# Patient Record
Sex: MALE | Race: WHITE | Employment: FULL TIME | URBAN - METROPOLITAN AREA
[De-identification: names, ages, dates, MRNs, and addresses within clinical notes are randomized per-mention and may not be internally consistent; named-entity substitution may affect disease eponyms.]

---

## 2024-01-14 ENCOUNTER — HOSPITAL ENCOUNTER (INPATIENT)
Facility: HOSPITAL | Age: 39
LOS: 4 days | Discharge: HOME | DRG: 751 | End: 2024-01-18
Attending: EMERGENCY MEDICINE | Admitting: PSYCHIATRY & NEUROLOGY
Payer: MEDICAID

## 2024-01-14 DIAGNOSIS — R45.851 SUICIDAL IDEATION: Primary | ICD-10-CM

## 2024-01-14 DIAGNOSIS — F10.10 ETOH ABUSE: ICD-10-CM

## 2024-01-14 PROBLEM — F32.2 MAJOR DEPRESSIVE DISORDER, SEVERE (HCC): Status: ACTIVE | Noted: 2024-01-14

## 2024-01-14 LAB
ALBUMIN SERPL-MCNC: 4.2 G/DL (ref 3.5–5)
ALBUMIN/GLOB SERPL: 1.2 (ref 1.1–2.2)
ALP SERPL-CCNC: 51 U/L (ref 45–117)
ALT SERPL-CCNC: 28 U/L (ref 12–78)
AMPHET UR QL SCN: NEGATIVE
ANION GAP SERPL CALC-SCNC: 4 MMOL/L (ref 5–15)
APAP SERPL-MCNC: <2 UG/ML (ref 10–30)
AST SERPL-CCNC: 30 U/L (ref 15–37)
BARBITURATES UR QL SCN: NEGATIVE
BASOPHILS # BLD: 0.1 K/UL (ref 0–0.1)
BASOPHILS NFR BLD: 1 % (ref 0–1)
BENZODIAZ UR QL: NEGATIVE
BILIRUB SERPL-MCNC: 0.4 MG/DL (ref 0.2–1)
BUN SERPL-MCNC: 9 MG/DL (ref 6–20)
BUN/CREAT SERPL: 10 (ref 12–20)
CALCIUM SERPL-MCNC: 8.4 MG/DL (ref 8.5–10.1)
CANNABINOIDS UR QL SCN: NEGATIVE
CHLORIDE SERPL-SCNC: 110 MMOL/L (ref 97–108)
CO2 SERPL-SCNC: 28 MMOL/L (ref 21–32)
COCAINE UR QL SCN: NEGATIVE
CREAT SERPL-MCNC: 0.91 MG/DL (ref 0.7–1.3)
DIFFERENTIAL METHOD BLD: ABNORMAL
EOSINOPHIL # BLD: 0.1 K/UL (ref 0–0.4)
EOSINOPHIL NFR BLD: 2 % (ref 0–7)
ERYTHROCYTE [DISTWIDTH] IN BLOOD BY AUTOMATED COUNT: 13.4 % (ref 11.5–14.5)
ETHANOL SERPL-MCNC: 168 MG/DL (ref 0–0.08)
ETHANOL SERPL-MCNC: 226 MG/DL (ref 0–0.08)
ETHANOL SERPL-MCNC: 284 MG/DL (ref 0–0.08)
FLUAV RNA SPEC QL NAA+PROBE: NOT DETECTED
FLUBV RNA SPEC QL NAA+PROBE: NOT DETECTED
GLOBULIN SER CALC-MCNC: 3.4 G/DL (ref 2–4)
GLUCOSE SERPL-MCNC: 97 MG/DL (ref 65–100)
HCT VFR BLD AUTO: 45.6 % (ref 36.6–50.3)
HGB BLD-MCNC: 15.9 G/DL (ref 12.1–17)
IMM GRANULOCYTES # BLD AUTO: 0 K/UL (ref 0–0.04)
IMM GRANULOCYTES NFR BLD AUTO: 0 % (ref 0–0.5)
LIPASE SERPL-CCNC: 60 U/L (ref 13–75)
LYMPHOCYTES # BLD: 2 K/UL (ref 0.8–3.5)
LYMPHOCYTES NFR BLD: 35 % (ref 12–49)
Lab: NORMAL
MCH RBC QN AUTO: 34.2 PG (ref 26–34)
MCHC RBC AUTO-ENTMCNC: 34.9 G/DL (ref 30–36.5)
MCV RBC AUTO: 98.1 FL (ref 80–99)
METHADONE UR QL: NEGATIVE
MONOCYTES # BLD: 0.5 K/UL (ref 0–1)
MONOCYTES NFR BLD: 9 % (ref 5–13)
NEUTS SEG # BLD: 3 K/UL (ref 1.8–8)
NEUTS SEG NFR BLD: 53 % (ref 32–75)
NRBC # BLD: 0 K/UL (ref 0–0.01)
NRBC BLD-RTO: 0 PER 100 WBC
OPIATES UR QL: NEGATIVE
PCP UR QL: NEGATIVE
PLATELET # BLD AUTO: 289 K/UL (ref 150–400)
PMV BLD AUTO: 9.9 FL (ref 8.9–12.9)
POTASSIUM SERPL-SCNC: 3.5 MMOL/L (ref 3.5–5.1)
PROT SERPL-MCNC: 7.6 G/DL (ref 6.4–8.2)
RBC # BLD AUTO: 4.65 M/UL (ref 4.1–5.7)
SALICYLATES SERPL-MCNC: 3.8 MG/DL (ref 2.8–20)
SARS-COV-2 RNA RESP QL NAA+PROBE: NOT DETECTED
SODIUM SERPL-SCNC: 142 MMOL/L (ref 136–145)
SPECIMEN HOLD: NORMAL
WBC # BLD AUTO: 5.7 K/UL (ref 4.1–11.1)

## 2024-01-14 PROCEDURE — 2580000003 HC RX 258: Performed by: NURSE PRACTITIONER

## 2024-01-14 PROCEDURE — 82077 ASSAY SPEC XCP UR&BREATH IA: CPT

## 2024-01-14 PROCEDURE — 36415 COLL VENOUS BLD VENIPUNCTURE: CPT

## 2024-01-14 PROCEDURE — 6370000000 HC RX 637 (ALT 250 FOR IP): Performed by: NURSE PRACTITIONER

## 2024-01-14 PROCEDURE — 90791 PSYCH DIAGNOSTIC EVALUATION: CPT

## 2024-01-14 PROCEDURE — 80053 COMPREHEN METABOLIC PANEL: CPT

## 2024-01-14 PROCEDURE — 80179 DRUG ASSAY SALICYLATE: CPT

## 2024-01-14 PROCEDURE — 1240000000 HC EMOTIONAL WELLNESS R&B

## 2024-01-14 PROCEDURE — 83690 ASSAY OF LIPASE: CPT

## 2024-01-14 PROCEDURE — 99285 EMERGENCY DEPT VISIT HI MDM: CPT

## 2024-01-14 PROCEDURE — 80143 DRUG ASSAY ACETAMINOPHEN: CPT

## 2024-01-14 PROCEDURE — 80307 DRUG TEST PRSMV CHEM ANLYZR: CPT

## 2024-01-14 PROCEDURE — 87636 SARSCOV2 & INF A&B AMP PRB: CPT

## 2024-01-14 PROCEDURE — 85025 COMPLETE CBC W/AUTO DIFF WBC: CPT

## 2024-01-14 RX ORDER — 0.9 % SODIUM CHLORIDE 0.9 %
1000 INTRAVENOUS SOLUTION INTRAVENOUS ONCE
Status: COMPLETED | OUTPATIENT
Start: 2024-01-14 | End: 2024-01-14

## 2024-01-14 RX ORDER — POLYETHYLENE GLYCOL 3350 17 G
2 POWDER IN PACKET (EA) ORAL
Status: DISCONTINUED | OUTPATIENT
Start: 2024-01-14 | End: 2024-01-18 | Stop reason: HOSPADM

## 2024-01-14 RX ORDER — NICOTINE 21 MG/24HR
1 PATCH, TRANSDERMAL 24 HOURS TRANSDERMAL ONCE
Status: DISCONTINUED | OUTPATIENT
Start: 2024-01-14 | End: 2024-01-14

## 2024-01-14 RX ORDER — MULTIVITAMIN WITH IRON
1 TABLET ORAL DAILY
Status: DISCONTINUED | OUTPATIENT
Start: 2024-01-15 | End: 2024-01-18 | Stop reason: HOSPADM

## 2024-01-14 RX ORDER — NICOTINE 21 MG/24HR
1 PATCH, TRANSDERMAL 24 HOURS TRANSDERMAL DAILY
Status: DISCONTINUED | OUTPATIENT
Start: 2024-01-15 | End: 2024-01-18 | Stop reason: HOSPADM

## 2024-01-14 RX ORDER — LANOLIN ALCOHOL/MO/W.PET/CERES
100 CREAM (GRAM) TOPICAL DAILY
Status: DISCONTINUED | OUTPATIENT
Start: 2024-01-15 | End: 2024-01-18 | Stop reason: HOSPADM

## 2024-01-14 RX ADMIN — SODIUM CHLORIDE 1000 ML: 9 INJECTION, SOLUTION INTRAVENOUS at 16:57

## 2024-01-14 RX ADMIN — SODIUM CHLORIDE 1000 ML: 9 INJECTION, SOLUTION INTRAVENOUS at 19:11

## 2024-01-14 ASSESSMENT — LIFESTYLE VARIABLES
HOW OFTEN DO YOU HAVE A DRINK CONTAINING ALCOHOL: 4 OR MORE TIMES A WEEK
HOW MANY STANDARD DRINKS CONTAINING ALCOHOL DO YOU HAVE ON A TYPICAL DAY: 10 OR MORE

## 2024-01-14 ASSESSMENT — PAIN DESCRIPTION - DESCRIPTORS: DESCRIPTORS: ACHING

## 2024-01-14 ASSESSMENT — PAIN DESCRIPTION - LOCATION: LOCATION: ABDOMEN

## 2024-01-14 ASSESSMENT — SLEEP AND FATIGUE QUESTIONNAIRES
DO YOU HAVE DIFFICULTY SLEEPING: YES
SLEEP PATTERN: DIFFICULTY FALLING ASLEEP
DO YOU USE A SLEEP AID: NO
AVERAGE NUMBER OF SLEEP HOURS: 6

## 2024-01-14 ASSESSMENT — PAIN SCALES - GENERAL
PAINLEVEL_OUTOF10: 5
PAINLEVEL_OUTOF10: 0

## 2024-01-14 ASSESSMENT — PAIN - FUNCTIONAL ASSESSMENT: PAIN_FUNCTIONAL_ASSESSMENT: 0-10

## 2024-01-14 NOTE — ED TRIAGE NOTES
Triage note: Pt reports etoh abuse and struggling with SI. Pt reports 8-10 drinks daily. Pt denies current plan for self harm. Current episode has been ongoing for 4-5 days. Last drink was 10 am today.     Pt further reports abd pain for past 2-3 months.    Pt uses She/Her pronouns and prefers the name \"Lucille\"

## 2024-01-14 NOTE — BSMART NOTE
BSMART assessment completed, and suicide risk level noted to be high. Charge Nurse, Conchis and ED Medical Provider, WLISON Forbes notified. Concerns observed by patient having access to her belongings and no 1:1 sitter present at this time.       
her job at [employer] after coming in this morning intoxicated.  Pt explains she has been living at a recovery home for the last 15-16 months and does not enjoy living w/ the other residents.  Pt reports a PMH of Bipolar Disorder and has been off all her medications for the last 3-4 months.  Pt reports she stopped participating in psych out-pt treatment since her recovery home moved to [facility] and has been feeling overwhelmed.  Pt reports previously seeing Elissa Avila LCSW for therapy, but does not remember the name of her psychiatrist.  Pt reports previous psychiatric hospitalizations in [place], [place] and [place].  Pt states she more recently was seen at [facility]'s ED 6 months ago, but was discharged home at that time. Pt reports daily ETOH use over the last 2 months stating she will drink about 2-4 cans of \"mixed drinks\" w/ her last drink being around 1000 this morning.  Pt denies any history of seizures, but reports previous dts 10 years ago.  Pt denies any other substance use, access to firearms or pending legal charges.  Pt denies using any medical equipment or issues completing her ADLs independently.     Pt is seeking voluntary psychiatric hospitalization d/t worsening SI and inability to contract for safety.  Pt is agreeable to transfer if needed for placement.  Consulted w/ the ED Medical Provider, Silas Forbes NP who is in agreement w/ plan for admission pending medical clearance.  Notified Aracelis w/ [facility] regarding need for bed placement.     The patient has demonstrated mental capacity to provide informed consent.  The information is given by the patient.  The Chief Complaint is SI.  The Precipitant Factors are ETOH abuse and noncompliance w/ medications.  Previous Hospitalizations: Yes, pt reports previous psychiatric hospitalizations in [place], [place] and [place].   The patient has not previously been in restraints.  Current Psychiatrist and/or  is N/A    Lethality

## 2024-01-14 NOTE — ED PROVIDER NOTES
[facility] EMERGENCY DEP  EMERGENCY DEPARTMENT ENCOUNTER      Pt Name: Meir Barker  MRN: [id number]  Birthdate 1985  Date of evaluation: 1/14/2024  Provider: TERRY Armijo - PAMELA   Patient is a 38 y.o. male with pmhx of biploar  who presents today with complaints of suicidal ideation.  Important to note- Patient's preferred name is Lucille, uses she/her pronouns. Endorses ETOH abuse- \" clubtails\" 4-5 mixed premade can drinks a day. States this has been going on for a week,  and feels like a danger to self. Denies SI plan.   Has had 4 drinks already this AM, last at 10 AM. Denies drug use, is a tobacco  smoker. Denies self harm.  Denies wepons in the home.  Also endrses generalized abd pain for the last 2-3 months, denies fevers, chills, N/V/D.  No previous hx of pancreatitis. Has had many episodes of ETOH withdrawal- last hopitalized for this 6 month ago. Has been off his biploar medication for the last month. Is not sleeping well.          There are no other complaints, changes or physical findings at this time.      PAST MEDICAL HISTORY     Past Medical History:   Diagnosis Date    Depression          SURGICAL HISTORY     History reviewed. No pertinent surgical history.      CURRENT MEDICATIONS       Previous Medications    No medications on file       ALLERGIES     Patient has no known allergies.    FAMILY HISTORY     History reviewed. No pertinent family history.       SOCIAL HISTORY       Social History     Socioeconomic History    Marital status: Single     Spouse name: None    Number of children: None    Years of education: None    Highest education level: None   Tobacco Use    Smoking status: Every Day     Types: Cigarettes    Smokeless tobacco: Never   Substance and Sexual Activity    Alcohol use: Yes     Alcohol/week: 70.0 standard drinks of alcohol     Types: 70 Cans of beer per week    Drug use: Not Currently           Review of Systems   Constitutional:  Negative for fever.   HENT:  Negative

## 2024-01-15 PROCEDURE — 6370000000 HC RX 637 (ALT 250 FOR IP)

## 2024-01-15 PROCEDURE — 6370000000 HC RX 637 (ALT 250 FOR IP): Performed by: PSYCHIATRY & NEUROLOGY

## 2024-01-15 PROCEDURE — 1240000000 HC EMOTIONAL WELLNESS R&B

## 2024-01-15 RX ORDER — HALOPERIDOL 5 MG/ML
5 INJECTION INTRAMUSCULAR EVERY 4 HOURS PRN
Status: DISCONTINUED | OUTPATIENT
Start: 2024-01-15 | End: 2024-01-18 | Stop reason: HOSPADM

## 2024-01-15 RX ORDER — SENNOSIDES A AND B 8.6 MG/1
1 TABLET, FILM COATED ORAL DAILY PRN
Status: DISCONTINUED | OUTPATIENT
Start: 2024-01-15 | End: 2024-01-18 | Stop reason: HOSPADM

## 2024-01-15 RX ORDER — MAGNESIUM HYDROXIDE/ALUMINUM HYDROXICE/SIMETHICONE 120; 1200; 1200 MG/30ML; MG/30ML; MG/30ML
30 SUSPENSION ORAL EVERY 6 HOURS PRN
Status: DISCONTINUED | OUTPATIENT
Start: 2024-01-15 | End: 2024-01-18 | Stop reason: HOSPADM

## 2024-01-15 RX ORDER — POLYETHYLENE GLYCOL 3350 17 G/17G
17 POWDER, FOR SOLUTION ORAL DAILY PRN
Status: DISCONTINUED | OUTPATIENT
Start: 2024-01-15 | End: 2024-01-18 | Stop reason: HOSPADM

## 2024-01-15 RX ORDER — HALOPERIDOL 5 MG/1
5 TABLET ORAL EVERY 4 HOURS PRN
Status: DISCONTINUED | OUTPATIENT
Start: 2024-01-15 | End: 2024-01-18 | Stop reason: HOSPADM

## 2024-01-15 RX ORDER — PHENOBARBITAL 32.4 MG/1
32.4 TABLET ORAL 2 TIMES DAILY
Status: COMPLETED | OUTPATIENT
Start: 2024-01-16 | End: 2024-01-16

## 2024-01-15 RX ORDER — DIPHENHYDRAMINE HYDROCHLORIDE 50 MG/ML
50 INJECTION INTRAMUSCULAR; INTRAVENOUS EVERY 4 HOURS PRN
Status: DISCONTINUED | OUTPATIENT
Start: 2024-01-15 | End: 2024-01-18 | Stop reason: HOSPADM

## 2024-01-15 RX ORDER — PHENOBARBITAL 32.4 MG/1
32.4 TABLET ORAL EVERY 6 HOURS PRN
Status: ACTIVE | OUTPATIENT
Start: 2024-01-15 | End: 2024-01-17

## 2024-01-15 RX ORDER — TRAZODONE HYDROCHLORIDE 50 MG/1
50 TABLET ORAL NIGHTLY PRN
Status: DISCONTINUED | OUTPATIENT
Start: 2024-01-15 | End: 2024-01-18 | Stop reason: HOSPADM

## 2024-01-15 RX ORDER — FOLIC ACID 1 MG/1
1 TABLET ORAL DAILY
Status: DISCONTINUED | OUTPATIENT
Start: 2024-01-15 | End: 2024-01-18 | Stop reason: HOSPADM

## 2024-01-15 RX ORDER — PHENOBARBITAL 32.4 MG/1
16.2 TABLET ORAL 2 TIMES DAILY
Status: COMPLETED | OUTPATIENT
Start: 2024-01-17 | End: 2024-01-17

## 2024-01-15 RX ORDER — PHENOBARBITAL 32.4 MG/1
16.2 TABLET ORAL EVERY 6 HOURS PRN
Status: ACTIVE | OUTPATIENT
Start: 2024-01-17 | End: 2024-01-18

## 2024-01-15 RX ORDER — PHENOBARBITAL 32.4 MG/1
32.4 TABLET ORAL 4 TIMES DAILY
Status: COMPLETED | OUTPATIENT
Start: 2024-01-15 | End: 2024-01-15

## 2024-01-15 RX ORDER — ACETAMINOPHEN 325 MG/1
650 TABLET ORAL EVERY 4 HOURS PRN
Status: DISCONTINUED | OUTPATIENT
Start: 2024-01-15 | End: 2024-01-18 | Stop reason: HOSPADM

## 2024-01-15 RX ORDER — HYDROXYZINE 50 MG/1
50 TABLET, FILM COATED ORAL 3 TIMES DAILY PRN
Status: DISCONTINUED | OUTPATIENT
Start: 2024-01-15 | End: 2024-01-18 | Stop reason: HOSPADM

## 2024-01-15 RX ADMIN — PHENOBARBITAL 32.4 MG: 32.4 TABLET ORAL at 10:57

## 2024-01-15 RX ADMIN — FOLIC ACID 1 MG: 1 TABLET ORAL at 10:57

## 2024-01-15 RX ADMIN — PHENOBARBITAL 32.4 MG: 32.4 TABLET ORAL at 21:30

## 2024-01-15 RX ADMIN — PHENOBARBITAL 32.4 MG: 32.4 TABLET ORAL at 17:50

## 2024-01-15 RX ADMIN — ACETAMINOPHEN 650 MG: 325 TABLET ORAL at 17:51

## 2024-01-15 RX ADMIN — PHENOBARBITAL 32.4 MG: 32.4 TABLET ORAL at 12:58

## 2024-01-15 RX ADMIN — CARIPRAZINE 1.5 MG: 1.5 CAPSULE, GELATIN COATED ORAL at 10:57

## 2024-01-15 RX ADMIN — HYDROXYZINE HYDROCHLORIDE 50 MG: 50 TABLET, FILM COATED ORAL at 08:54

## 2024-01-15 ASSESSMENT — PAIN DESCRIPTION - LOCATION: LOCATION: HEAD

## 2024-01-15 ASSESSMENT — PAIN DESCRIPTION - DESCRIPTORS: DESCRIPTORS: ACHING

## 2024-01-15 ASSESSMENT — PAIN SCALES - GENERAL: PAINLEVEL_OUTOF10: 4

## 2024-01-15 NOTE — PLAN OF CARE
Meir is resting quietly in bed at this time, q15 minute safety checks maintained. Breathing is even and unlabored, patient is in NAD.    Problem: Sleep Disturbance  Goal: Will exhibit normal sleeping pattern  Description: INTERVENTIONS:  1. Administer medication as ordered  2. Decrease environmental stimuli, including noise, as appropriate  3. Discourage social isolation and naps during the day  Outcome: Progressing     Problem: Pain  Goal: Verbalizes/displays adequate comfort level or baseline comfort level  Outcome: Progressing

## 2024-01-15 NOTE — PLAN OF CARE
Problem: Depression  Goal: Will be euthymic at discharge  Description: INTERVENTIONS:  1. Administer medication as ordered  2. Provide emotional support via 1:1 interaction with staff  3. Encourage involvement in milieu/groups/activities  4. Monitor for social isolation  1/15/2024 1541 by Britt eWlsh, RN  Outcome: Progressing   Pt isolative to room. Denies current SI/HI/AVH. Remains medication and meal compliant. Will continue to monitor q15 minutes for safety.

## 2024-01-15 NOTE — H&P
PSYCHIATRY EVALUATION NOTE    CHIEF COMPLAINT:  \"I just want to feel better.\"    HISTORY OF PRESENTING COMPLAINT:  Meir Barker is a 38 y.o. White (non-) adult who is currently admitted to the acute/general side of 7th floor behavioral health Unit at [facility].     Patient identifies as a female, goes by 'Lucille.'    Patient's co-workers wanted her to go to [facility] because they were concerned about her behavior and suspected that she was under the influence of alcohol. Patient says that she preferred to go to the inpatient psychiatric setting to received comprehensive services for depressive symptoms and possibly pursue sobriety after detox.    Upon evaluation patient appears subdued and describes struggling with depressive symptoms and passive death wish. At this time no suicidal intent or plan.    Denies experiencing hallucinations of any type.  No fire-arms.      PAST PSYCHIATRIC HISTORY   many past inpatient psychiatric admissions, last being  2 years  1 suicide attempts, last being in mid 20's.    Last seen psychiatry 2-3 months. Provider is [facility] who prescribes medication, seen once or twice.  Kallie is therapist for about 1 years, seen weekly.    Last rehab was 2 years ago, at [facility]. Did attend 12 step.  Looking into rehab.    Poor tolerance of effexor and wellbutrin, causing patient to have 'manic' episodes.  Feels vraylar helped best.  Lamictal helped.  Naltrexone for alochol.    SUBSTANCE USE HISTORY:  Tobacco: 1/2 PPD  Cannabis: none  Alcohol: started drinking at 19. Last drink yesterday. Has been drinking daily for 2 weeks. Drinks cans of pre-mixed alcohol. 10-12% 16oz. Says that consumes 3-6 cans. First drink in am.  No withdrawal seizures.  Struggles with sweats, flushed. No DUIs. Memory blackouts. This is affecting current job.   IVD: none  No Cocaine/Heroin/amphetamines/LSD/PCP/Ketamine    PAST MEDICAL HISTORY:    Please see H&P for details.     Past Medical

## 2024-01-15 NOTE — PLAN OF CARE
Problem: Depression  Goal: Will be euthymic at discharge  Description: INTERVENTIONS:  1. Administer medication as ordered  2. Provide emotional support via 1:1 interaction with staff  3. Encourage involvement in milieu/groups/activities  4. Monitor for social isolation  Outcome: Progressing  Note: Out on unit passively engaged, mood and affect sad to anxious. Reports feeling sad, shame and guilt for his etoh use and ongoing sadness. Denies SI with no plan, no intent and no self harming behaviors. States feeling safe on unit. Daily goal is to rest, discuss medications. Staff focus is on offering support     Problem: Drug Abuse/Detox  Goal: Will have no detox symptoms and will verbalize plan for changing drug-related behavior  Description: INTERVENTIONS:  1. Administer medication as ordered  2. Monitor physical status  3. Provide emotional support with 1:1 interaction with staff  4. Encourage  recovery focused treatment   Outcome: Progressing  Flowsheets (Taken 1/15/2024 1011)  Will have no detox symptoms and will verbalize plan for changing drug-related behavior:   Administer medication as ordered   Monitor physical status   Encourage recovery focused treatment   Provide emotional support with 1:1 interaction with staff  Note: CIWA unremarkable, review detox protocol and orders available. Pt verbalized understanidng

## 2024-01-15 NOTE — ED NOTES
TRANSFER - OUT REPORT:    Verbal report given to tonya Prado  on Meir Barker  being transferred to  72 for routine progression of patient care       Report consisted of patient's Situation, Background, Assessment and   Recommendations(SBAR).     Information from the following report(s) Nurse Handoff Report, Index, ED Encounter Summary, Adult Overview, and Recent Results was reviewed with the receiving nurse.    [place] Fall Assessment:    Presents to emergency department  because of falls (Syncope, seizure, or loss of consciousness): No  Age > 70: No  Altered Mental Status, Intoxication with alcohol or substance confusion (Disorientation, impaired judgment, poor safety awaremess, or inability to follow instructions): No  Impaired Mobility: Ambulates or transfers with assistive devices or assistance; Unable to ambulate or transer.: No  Nursing Judgement: No          Lines:   Peripheral IV 01/14/24 Proximal;Right;Ventral Forearm (Active)        Opportunity for questions and clarification was provided.      Patient transported with:  Registered Nurse and Security

## 2024-01-16 LAB
ALBUMIN SERPL-MCNC: 3.4 G/DL (ref 3.5–5)
ALBUMIN/GLOB SERPL: 1.2 (ref 1.1–2.2)
ALP SERPL-CCNC: 43 U/L (ref 45–117)
ALT SERPL-CCNC: 26 U/L (ref 12–78)
ANION GAP SERPL CALC-SCNC: 9 MMOL/L (ref 5–15)
AST SERPL-CCNC: 20 U/L (ref 15–37)
BASOPHILS # BLD: 0.1 K/UL (ref 0–0.1)
BASOPHILS NFR BLD: 1 % (ref 0–1)
BILIRUB SERPL-MCNC: 1 MG/DL (ref 0.2–1)
BUN SERPL-MCNC: 10 MG/DL (ref 6–20)
BUN/CREAT SERPL: 11 (ref 12–20)
CALCIUM SERPL-MCNC: 8.4 MG/DL (ref 8.5–10.1)
CHLORIDE SERPL-SCNC: 110 MMOL/L (ref 97–108)
CHOLEST SERPL-MCNC: 160 MG/DL
CO2 SERPL-SCNC: 23 MMOL/L (ref 21–32)
CREAT SERPL-MCNC: 0.89 MG/DL (ref 0.7–1.3)
DIFFERENTIAL METHOD BLD: NORMAL
EKG ATRIAL RATE: 55 BPM
EKG DIAGNOSIS: NORMAL
EKG P AXIS: 70 DEGREES
EKG P-R INTERVAL: 148 MS
EKG Q-T INTERVAL: 406 MS
EKG QRS DURATION: 82 MS
EKG QTC CALCULATION (BAZETT): 388 MS
EKG R AXIS: 78 DEGREES
EKG T AXIS: 70 DEGREES
EKG VENTRICULAR RATE: 55 BPM
EOSINOPHIL # BLD: 0.1 K/UL (ref 0–0.4)
EOSINOPHIL NFR BLD: 3 % (ref 0–7)
ERYTHROCYTE [DISTWIDTH] IN BLOOD BY AUTOMATED COUNT: 13.2 % (ref 11.5–14.5)
EST. AVERAGE GLUCOSE BLD GHB EST-MCNC: 97 MG/DL
GLOBULIN SER CALC-MCNC: 2.9 G/DL (ref 2–4)
GLUCOSE SERPL-MCNC: 94 MG/DL (ref 65–100)
HBA1C MFR BLD: 5 % (ref 4–5.6)
HCT VFR BLD AUTO: 43.3 % (ref 36.6–50.3)
HDLC SERPL-MCNC: 66 MG/DL
HDLC SERPL: 2.4 (ref 0–5)
HGB BLD-MCNC: 14.9 G/DL (ref 12.1–17)
IMM GRANULOCYTES # BLD AUTO: 0 K/UL (ref 0–0.04)
IMM GRANULOCYTES NFR BLD AUTO: 0 % (ref 0–0.5)
LDLC SERPL CALC-MCNC: 59 MG/DL (ref 0–100)
LYMPHOCYTES # BLD: 1.5 K/UL (ref 0.8–3.5)
LYMPHOCYTES NFR BLD: 33 % (ref 12–49)
MCH RBC QN AUTO: 32.9 PG (ref 26–34)
MCHC RBC AUTO-ENTMCNC: 34.4 G/DL (ref 30–36.5)
MCV RBC AUTO: 95.6 FL (ref 80–99)
MONOCYTES # BLD: 0.6 K/UL (ref 0–1)
MONOCYTES NFR BLD: 12 % (ref 5–13)
NEUTS SEG # BLD: 2.3 K/UL (ref 1.8–8)
NEUTS SEG NFR BLD: 51 % (ref 32–75)
NRBC # BLD: 0 K/UL (ref 0–0.01)
NRBC BLD-RTO: 0 PER 100 WBC
PLATELET # BLD AUTO: 243 K/UL (ref 150–400)
PMV BLD AUTO: 10.2 FL (ref 8.9–12.9)
POTASSIUM SERPL-SCNC: 3.9 MMOL/L (ref 3.5–5.1)
PROT SERPL-MCNC: 6.3 G/DL (ref 6.4–8.2)
RBC # BLD AUTO: 4.53 M/UL (ref 4.1–5.7)
SODIUM SERPL-SCNC: 142 MMOL/L (ref 136–145)
TRIGL SERPL-MCNC: 175 MG/DL
VLDLC SERPL CALC-MCNC: 35 MG/DL
WBC # BLD AUTO: 4.5 K/UL (ref 4.1–11.1)

## 2024-01-16 PROCEDURE — 80061 LIPID PANEL: CPT

## 2024-01-16 PROCEDURE — 80053 COMPREHEN METABOLIC PANEL: CPT

## 2024-01-16 PROCEDURE — 6370000000 HC RX 637 (ALT 250 FOR IP)

## 2024-01-16 PROCEDURE — 85025 COMPLETE CBC W/AUTO DIFF WBC: CPT

## 2024-01-16 PROCEDURE — 6370000000 HC RX 637 (ALT 250 FOR IP): Performed by: PSYCHIATRY & NEUROLOGY

## 2024-01-16 PROCEDURE — 1240000000 HC EMOTIONAL WELLNESS R&B

## 2024-01-16 PROCEDURE — 93005 ELECTROCARDIOGRAM TRACING: CPT | Performed by: PSYCHIATRY & NEUROLOGY

## 2024-01-16 PROCEDURE — 83036 HEMOGLOBIN GLYCOSYLATED A1C: CPT

## 2024-01-16 PROCEDURE — 36415 COLL VENOUS BLD VENIPUNCTURE: CPT

## 2024-01-16 RX ADMIN — CARIPRAZINE 1.5 MG: 1.5 CAPSULE, GELATIN COATED ORAL at 08:51

## 2024-01-16 RX ADMIN — TRAZODONE HYDROCHLORIDE 50 MG: 50 TABLET ORAL at 20:53

## 2024-01-16 RX ADMIN — PHENOBARBITAL 32.4 MG: 32.4 TABLET ORAL at 20:53

## 2024-01-16 RX ADMIN — PHENOBARBITAL 32.4 MG: 32.4 TABLET ORAL at 08:52

## 2024-01-16 RX ADMIN — THERA TABS 1 TABLET: TAB at 08:52

## 2024-01-16 RX ADMIN — Medication 100 MG: at 08:52

## 2024-01-16 RX ADMIN — FOLIC ACID 1 MG: 1 TABLET ORAL at 08:52

## 2024-01-16 NOTE — PLAN OF CARE
Problem: Depression  Goal: Will be euthymic at discharge  Description: INTERVENTIONS:  1. Administer medication as ordered  2. Provide emotional support via 1:1 interaction with staff  3. Encourage involvement in milieu/groups/activities  4. Monitor for social isolation  1/16/2024 1546 by Britt Welsh, RN  Outcome: Progressing     Problem: Safety - Adult  Goal: Free from fall injury  1/16/2024 1546 by Britt eWlsh, RN  Outcome: Progressing   Pt isolative to self. Denies current SI/HI/AVH. Remains medication and meal compliant. Will continue to monitor q15 minutes for safety.

## 2024-01-16 NOTE — PLAN OF CARE
Problem: Depression  Goal: Will be euthymic at discharge  Description: INTERVENTIONS:  1. Administer medication as ordered  2. Provide emotional support via 1:1 interaction with staff  3. Encourage involvement in milieu/groups/activities  4. Monitor for social isolation  Outcome: Progressing  Note: Withdrawn to room, declined to attend groups, helplessness behaviors noted. Denies SI with no plan, no intent and no self harming behaviors. Daily goal is to rest, read and discuss d/c plans to sober living. Staff focus is on offering support     Problem: Drug Abuse/Detox  Goal: Will have no detox symptoms and will verbalize plan for changing drug-related behavior  Description: INTERVENTIONS:  1. Administer medication as ordered  2. Monitor physical status  3. Provide emotional support with 1:1 interaction with staff  4. Encourage  recovery focused treatment   Outcome: Progressing  Flowsheets (Taken 1/16/2024 0156)  Will have no detox symptoms and will verbalize plan for changing drug-related behavior:   Administer medication as ordered   Monitor physical status   Encourage recovery focused treatment  Note: WILLIAM gimenez

## 2024-01-16 NOTE — INTERDISCIPLINARY ROUNDS
Behavioral Health Interdisciplinary Rounds     Patient Name: Meir Barker  Age: 38 y.o.  Room/Bed:  72/  Primary Diagnosis: Major depressive disorder, severe (HCC)   Admission Status: Voluntary    Readmission within 30 days: No  Power of  in place: No  Patient requires a blocked bed: Yes          Reason for blocked bed: transgender  Sleep hours:       Participation in Care/Groups:  Yes  Medication Compliant?: Yes  PRNS (last 24 hours): atarax, tylenol   Restraints (last 24 hours):  No  __________________________________________________  OQ Admission Analysis Survey completed:  OQ Admission Analysis Survey score:  __________________________________________________     Alcohol screening (AUDIT) completed -     If applicable, date SBIRT discussed in treatment team AND documented:    Tobacco - patient is a smoker:    Illegal Drugs use:      24 hour chart check complete: Yes    _______________________________________________    Patient goal(s) for today:   Treatment team focus/goals:   Progress note: Patient met with treatment team and appeared with a drowsy mood and affect. Patient endorsing detox symptoms of sweating, drowsiness and headache. Patient reports fair sleep overnight and fair appetite. Patient reports mood is down, stating slightly better since time of admission. Patient denies SI/HI and AHVH. Patient tolerating Vraylar so far. Patient requesting to shower and to shave. Plan to continue to monitor medications and withdrawals.     Spiritual Care Consult: Yes  Financial concerns/prescription coverage: Insured  Family contact: None  Family requesting physician contact today:  No  Discharge plan: Discharge home  Access to weapons : No                          Outpatient provider(s): [facility]    LOS:  2  Expected LOS:     Participating treatment team members: Meir Barker, CHELSIE Reddy, Aleyda ORR RN, Minerva Aldana NP, Joann Herrera PharmD

## 2024-01-16 NOTE — PLAN OF CARE
Problem: Safety - Adult  Goal: Free from fall injury  Outcome: Progressing   Pt resting in bed with eyes closed, appears to be sleeping. Respirations even and unlabored, no respiratory distress noted. Q15 min safety checks in place.

## 2024-01-17 PROCEDURE — 6370000000 HC RX 637 (ALT 250 FOR IP): Performed by: PSYCHIATRY & NEUROLOGY

## 2024-01-17 PROCEDURE — 6370000000 HC RX 637 (ALT 250 FOR IP)

## 2024-01-17 PROCEDURE — 1240000000 HC EMOTIONAL WELLNESS R&B

## 2024-01-17 RX ADMIN — PHENOBARBITAL 16.2 MG: 32.4 TABLET ORAL at 09:40

## 2024-01-17 RX ADMIN — THERA TABS 1 TABLET: TAB at 09:40

## 2024-01-17 RX ADMIN — Medication 100 MG: at 09:41

## 2024-01-17 RX ADMIN — TRAZODONE HYDROCHLORIDE 50 MG: 50 TABLET ORAL at 20:39

## 2024-01-17 RX ADMIN — FOLIC ACID 1 MG: 1 TABLET ORAL at 09:41

## 2024-01-17 RX ADMIN — CARIPRAZINE 1.5 MG: 1.5 CAPSULE, GELATIN COATED ORAL at 09:41

## 2024-01-17 RX ADMIN — PHENOBARBITAL 16.2 MG: 32.4 TABLET ORAL at 20:39

## 2024-01-17 ASSESSMENT — PAIN SCALES - GENERAL: PAINLEVEL_OUTOF10: 0

## 2024-01-17 NOTE — DISCHARGE INSTRUCTIONS
DISCHARGE SUMMARY    NAME:Meir Barker  : 1985  MRN: [id number]    The patient Meir Barker exhibits the ability to control behavior in a less restrictive environment.  Patient's level of functioning is improving.  No assaultive/destructive behavior has been observed for the past 24 hours.  No suicidal/homicidal threat or behavior has been observed for the past 24 hours.  There is no evidence of serious medication side effects.  Patient has not been in physical or protective restraints for at least the past 24 hours.    If weapons involved, how are they secured? None    Is patient aware of and in agreement with discharge plan? Yes    Arrangements for medication:  Prescriptions filled through [facility], 30 day supply provided    Copy of discharge instructions to provider?: Yes    Arrangements for transportation home: Lyft    Keep all follow up appointments as scheduled, continue to take prescribed medications per physician instructions.  Mental health crisis number:  911 or your local mental health crisis line number at [facility] at [phone]      Mental Health Emergency WARM LINE      [phone]-MHAV [phone])      M-F: 9am to 9pm      Sat & Sun: 5pm - 9pm  National suicide prevention lines:                             8-714-QJIGVIT (1-690.991.6976)       8-518-148-TALK ([phone])    Crisis Text Line:  Text HOME to 334490        DISCHARGE SUMMARY from Nurse    PATIENT INSTRUCTIONS:    What to do at Home:  Recommended activity: activity as tolerated,     If you experience any of the following symptoms: urges to drink alcohol, anxiety that is intense and overwhelming that leads to feeling unsafe - talk with staff at [facility]. If you are feeling unsafe and your support system is not available immediately call your local crisis number at [phone]    *  Please give a list of your current medications to your Primary Care Provider.    *  Please update this list whenever your

## 2024-01-17 NOTE — PLAN OF CARE
Problem: Discharge Planning  Goal: Discharge to home or other facility with appropriate resources  Flowsheets (Taken 1/17/2024 0027)  Discharge to home or other facility with appropriate resources:   Identify barriers to discharge with patient and caregiver   Identify discharge learning needs (meds, wound care, etc)

## 2024-01-17 NOTE — PLAN OF CARE
Problem: Depression  Goal: Will be euthymic at discharge  Description: INTERVENTIONS:  1. Administer medication as ordered  2. Provide emotional support via 1:1 interaction with staff  3. Encourage involvement in milieu/groups/activities  4. Monitor for social isolation  Outcome: Progressing     Problem: Drug Abuse/Detox  Goal: Will have no detox symptoms and will verbalize plan for changing drug-related behavior  Description: INTERVENTIONS:  1. Administer medication as ordered  2. Monitor physical status  3. Provide emotional support with 1:1 interaction with staff  4. Encourage  recovery focused treatment   Outcome: Progressing    1240: Patient is participatory in treatment team. Mood and affect; calm, cooperative and pleasant but remains isolative at times. Denies SI/HI. Denies AH/VH. Medication and meal complaint. Plan of care ongoing, no further concerns as of present. Patient expresses no other needs at this time.  Plans are for discharge tomorrow.

## 2024-01-17 NOTE — PLAN OF CARE
Problem: Depression  Goal: Will be euthymic at discharge  Description: INTERVENTIONS:  1. Administer medication as ordered  2. Provide emotional support via 1:1 interaction with staff  3. Encourage involvement in milieu/groups/activities  4. Monitor for social isolation  1/17/2024 1546 by Britt Welsh, RN  Outcome: Progressing     Problem: Safety - Adult  Goal: Free from fall injury  Outcome: Progressing   Pt out on unit for meds and meals, otherwise isolative. Denies current SI/HI/AVH. Remains medication and meal compliant. Will continue to monitor q15 minutes for safety.

## 2024-01-18 VITALS
DIASTOLIC BLOOD PRESSURE: 72 MMHG | HEART RATE: 77 BPM | WEIGHT: 148.59 LBS | BODY MASS INDEX: 21.27 KG/M2 | HEIGHT: 70 IN | OXYGEN SATURATION: 100 % | TEMPERATURE: 98.1 F | SYSTOLIC BLOOD PRESSURE: 105 MMHG | RESPIRATION RATE: 16 BRPM

## 2024-01-18 PROCEDURE — 6370000000 HC RX 637 (ALT 250 FOR IP): Performed by: PSYCHIATRY & NEUROLOGY

## 2024-01-18 PROCEDURE — 6370000000 HC RX 637 (ALT 250 FOR IP)

## 2024-01-18 RX ORDER — TRAZODONE HYDROCHLORIDE 50 MG/1
50 TABLET ORAL NIGHTLY PRN
Qty: 30 TABLET | Refills: 0 | Status: SHIPPED | OUTPATIENT
Start: 2024-01-18

## 2024-01-18 RX ORDER — MULTIVITAMIN WITH IRON
1 TABLET ORAL DAILY
Qty: 30 TABLET | Refills: 0 | Status: SHIPPED | OUTPATIENT
Start: 2024-01-19

## 2024-01-18 RX ADMIN — CARIPRAZINE 1.5 MG: 1.5 CAPSULE, GELATIN COATED ORAL at 09:05

## 2024-01-18 RX ADMIN — FOLIC ACID 1 MG: 1 TABLET ORAL at 09:05

## 2024-01-18 RX ADMIN — THERA TABS 1 TABLET: TAB at 09:05

## 2024-01-18 RX ADMIN — Medication 100 MG: at 09:05

## 2024-01-18 ASSESSMENT — PAIN SCALES - GENERAL: PAINLEVEL_OUTOF10: 0

## 2024-01-18 NOTE — PLAN OF CARE
Problem: Discharge Planning  Goal: Discharge to home or other facility with appropriate resources  Flowsheets (Taken 1/17/2024 2340)  Discharge to home or other facility with appropriate resources:   Identify barriers to discharge with patient and caregiver   Identify discharge learning needs (meds, wound care, etc)     Problem: Drug Abuse/Detox  Goal: Will have no detox symptoms and will verbalize plan for changing drug-related behavior  Description: INTERVENTIONS:  1. Administer medication as ordered  2. Monitor physical status  3. Provide emotional support with 1:1 interaction with staff  4. Encourage  recovery focused treatment   Flowsheets (Taken 1/17/2024 2340)  Will have no detox symptoms and will verbalize plan for changing drug-related behavior:   Administer medication as ordered   Provide emotional support with 1:1 interaction with staff

## 2024-01-18 NOTE — DISCHARGE SUMMARY
and judgment are partial    PROGNOSIS:   Good / Fair based on nature of patient's pathology/ies and treatment compliance issues. Prognosis is greatly dependent upon patient's motivation, decisions, and ability to follow up on psychiatric/psychotherapy appointments, ability to follow recommended lifestyle modifications such as abstaining from illicit drug and alcohol abuse, as well as to comply with psychiatric medications as prescribed.

## 2024-01-18 NOTE — BH NOTE
[facility]  PSYCHIATRIC PROGRESS NOTE    Patient: Meir Barker MRN: [id number]  SSN: [id number]    YOB: 1985  Age: 38 y.o.  Sex: adult      Admit Date: 1/14/2024    Length of Stay: 2 Days    Legal Status: Voluntary    Chief Complaint: \"I still kind of had the sweats this morning.\"     Interval History:   1/16/24- Lucille reports some ongoing detox symptoms of headache, sweating, but feels her overall detox is getting better. She slept well overall last night though she is still feeling tired today. Mood is described as \"kind of down,\" though she does feel it is somewhat improving. Appetite is good. She is tolerating her Vraylar well without adverse effects. She denies any SI/HI/AH/VH. She is calm and appropriate. Denies other changes or new concerns.   Nursing reports the pt has not been participating in groups.     Vitals:  Patient Vitals for the past 24 hrs:   Temp Pulse Resp BP SpO2   01/16/24 0844 97.5 °F (36.4 °C) 80 16 97/86 100 %   01/15/24 2000 97.7 °F (36.5 °C) 62 14 121/89 100 %   01/15/24 1610 97.9 °F (36.6 °C) 63 18 (!) 112/90 98 %   01/15/24 1047 97.9 °F (36.6 °C) 77 20 125/86 99 %     Labs:  Lab Results   Component Value Date/Time    WBC 4.5 01/16/2024 05:27 AM    HGB 14.9 01/16/2024 05:27 AM    HCT 43.3 01/16/2024 05:27 AM     01/16/2024 05:27 AM    MCV 95.6 01/16/2024 05:27 AM      Lab Results   Component Value Date/Time     01/16/2024 05:27 AM    K 3.9 01/16/2024 05:27 AM     01/16/2024 05:27 AM    CO2 23 01/16/2024 05:27 AM    BUN 10 01/16/2024 05:27 AM    GLOB 2.9 01/16/2024 05:27 AM    ALT 26 01/16/2024 05:27 AM      Scheduled Meds:   PHENobarbital  32.4 mg Oral BID    Followed by    [START ON 1/17/2024] PHENobarbital  16.2 mg Oral BID    folic acid  1 mg Oral Daily    cariprazine hcl  1.5 mg Oral Daily    thiamine  100 mg Oral Daily    multivitamin  1 tablet Oral Daily    nicotine  1 patch TransDERmal Daily     PRN Meds:  acetaminophen, polyethylene 
Behavioral Health Interdisciplinary Rounds     Patient goal(s) for today: Express needs o staff and discuss readiness for D/C  Treatment team focus/goals: Assess progress and discuss D/C  Progress note: Pt met with tx team. Appeared fair mood and calm affect. Pt reported some difficulty sleeping, but reports this is typical at baseline. Pt will D/C today with medications.        LOS:  4  Expected LOS: 4    Participating treatment team members: Minerva Bryant NP, Aleyda Ojeda, RN, Melisa ANGELES, MSW Student  
Exit OQ complete   
GROUP THERAPY PROGRESS NOTE    Patient did not participate in Healthy living group.    Katherine Gillies MSW, [facility]  
GROUP THERAPY PROGRESS NOTE    Patient did not participate in Psychoeducation group.    Katherine Gillies MSW, [facility]  
GROUP THERAPY PROGRESS NOTE    Patient did not participate in Self-care group.    Katherine Gillies MSW, [facility]  
GROUP THERAPY PROGRESS NOTE    Patient did not participate in psychoeducation group.    Katherine Gillies MSW, [facility]  
GROUP THERAPY PROGRESS NOTE    Patient did not participate in recreational therapy group.    Katherine Gillies, MSW, [facility]  
GROUP THERAPY PROGRESS NOTE    Patient did not participate in recreational therapy group.    Katherine Gillies, MSW, [facility]    
Interdisciplinary Rounds Note         Patient goal(s) for today: Communicate needs to staff   Treatment team focus/goals: Assess pt, manage medications, discharge planning   Progress note: Pt identifies as female and asks to be called 'Lucille'. Lucille endorses SI, Pt denies HI, AVH. Pts mood is low and congruent with appearance. Pt states she was intoxicated at work and was sent here for help. Dr restarted pts meds. Pt has been isolative. SW will provide pt with options for follow up tx.     LOS:  1  Expected LOS: TBD     Financial concerns/prescription coverage:  no   Family contact:       Family requesting physician contact today:  No  Discharge plan: Pt to return to [facility].  Guns in the home: no         Outpatient provider(s): TYLER    Participating treatment team members: Allison Bryant G, MSW, Aleyda ORR RN, Joann MILLER, PHARMD, Dr. Roach   
PRN Medication Documentation    Specific patient behavior that led to need for PRN medication: mild anxiety and nausea r/t alcohol detox  Staff interventions attempted prior to PRN being given: assessment, pt medicated due to need  PRN medication given: atarax 50 mg prn  Patient response/effectiveness of PRN medication: some effect    
Pt arrived from [facility] ED to [facility] escorted by transport team. Pt calm and cooperative at this time. Pt oriented to [facility], voluntary consent to treatment form signed. Pt vital signs collected, pt changed into green gown for skin assessment by nursing staff.    4 Eyes Skin Assessment     NAME:  Meir Barker  YOB: 1985  MEDICAL RECORD NUMBER:  [id number]    The patient is being assessed for  Admission    I agree that at least one RN has performed a thorough Head to Toe Skin Assessment on the patient. ALL assessment sites listed below have been assessed.      Areas assessed by both nurses:    Head, Face, Ears, Shoulders, Back, Chest, Arms, Elbows, Hands, Sacrum. Buttock, Coccyx, Ischium, and Legs. Feet and Heels        Does the Patient have a Wound? No noted wound(s)       Vitor Prevention initiated by RN: No  Wound Care Orders initiated by RN: No    Pressure Injury (Stage 3,4, Unstageable, DTI, NWPT, and Complex wounds) if present, place Wound referral order by RN under : No    New Ostomies, if present place, Ostomy referral order under : No     Nurse 1 eSignature: Electronically signed by Berenice Yadav RN on 1/14/24 at 10:32 PM EST    **SHARE this note so that the co-signing nurse can place an eSignature**    Nurse 2 eSignature: Aleyda Serrano LPN    Pt cooperative with admission process, denies SI/HI/AVH at this time. CIWA assessed to be 2, pt denying withdrawal symptoms.    
Pt requested prn trazodone for sleep and was given prn trazodone 50mg   
TRANSFER - IN REPORT:    Verbal report received from Sue MANN on Meir Barker  being received from [facility] ED for routine progression of patient care      Report consisted of patient's Situation, Background, Assessment and   Recommendations(SBAR).     Information from the following report(s) ED SBAR was reviewed with the receiving nurse.    Opportunity for questions and clarification was provided.      Assessment completed upon patient's arrival to unit and care assumed.     
05:27 AM     01/16/2024 05:27 AM    CO2 23 01/16/2024 05:27 AM    BUN 10 01/16/2024 05:27 AM    GLOB 2.9 01/16/2024 05:27 AM    ALT 26 01/16/2024 05:27 AM      Scheduled Meds:   folic acid  1 mg Oral Daily    cariprazine hcl  1.5 mg Oral Daily    thiamine  100 mg Oral Daily    multivitamin  1 tablet Oral Daily    nicotine  1 patch TransDERmal Daily     PRN Meds:  acetaminophen, polyethylene glycol, senna, aluminum & magnesium hydroxide-simethicone, hydrOXYzine HCl, haloperidol **OR** haloperidol lactate, diphenhydrAMINE, traZODone, nicotine polacrilex    Mental Status Exam:  38 y.o. trans female adult in moderate grooming, dressed in casual attire, moderately engaged in evaluation.   Makes fair eye contact.  Psychomotor activity is WNL, no adventitious movements  Speech is normal in volume, tone, output and prosody   Mood is described as \"better\"   Affect is euthymic but guarded  No perceptual abnormalities elicited; no auditory/visual hallucinations reported, no overt signs of psychosis or paranoia.   Thought process is logical, linear and goal directed  Thought content is negative for suicidal or homicidal ideation  Alert, awake and oriented in all spheres  Attention/Concentration are intact  Insight and judgment are partial    Assessment:   Meir Barker meets criteria for a diagnosis of:  MDD, recurrent, severe, w/o psychosis  Alcohol use d/o  Tobacco use d/o    Plan:   1/18/24- Routine discharge today.     1/17/24- Tentative discharge tomorrow, no other changes    1/16/24- Continue the medication regimen as prescribed. Disposition planning to continue.     A coordinated, multidisplinary treatment team round was conducted with the patient, nurses, pharmcist,  and writer present. Discussions held with , and/or with family members; Complete current electronic health record for patient was reviewed in full including consultant notes, ancillary staff notes, nurses and tech notes, 
Yes    Legal issues: No pending legal charges    History of  service: None stated    Financial status: Employed    [employer]/cultural factors: None stated    Education/work history: Achieved high school level of education    Have you been licensed as a health care professional (current or ): No    Describe coping skills: Limited, ineffective    BONY Reddy  1/15/2024    
folic acid  1 mg Oral Daily    cariprazine hcl  1.5 mg Oral Daily    thiamine  100 mg Oral Daily    multivitamin  1 tablet Oral Daily    nicotine  1 patch TransDERmal Daily     PRN Meds:  acetaminophen, polyethylene glycol, senna, aluminum & magnesium hydroxide-simethicone, hydrOXYzine HCl, haloperidol **OR** haloperidol lactate, diphenhydrAMINE, traZODone, [] PHENobarbital **FOLLOWED BY** PHENobarbital, nicotine polacrilex    Mental Status Exam:  38 y.o. trans female adult in moderate grooming, dressed in casual attire, moderately engaged in evaluation.   Makes fair eye contact.  Psychomotor activity is WNL, no adventitious movements  Speech is normal in volume, tone, output and prosody   Mood is described as \"better\"   Affect is euthymic but guarded  No perceptual abnormalities elicited; no auditory/visual hallucinations reported, no overt signs of psychosis or paranoia.   Thought process is logical, linear and goal directed  Thought content is negative for suicidal or homicidal ideation  Alert, awake and oriented in all spheres  Attention/Concentration are intact  Insight and judgment are partial    Assessment:   Meir Barker meets criteria for a diagnosis of:  MDD, recurrent, severe, w/o psychosis  Alcohol use d/o  Tobacco use d/o    Plan:   24- Tentative discharge tomorrow, no other changes    24- Continue the medication regimen as prescribed. Disposition planning to continue.     A coordinated, multidisplinary treatment team round was conducted with the patient, nurses, pharmcist,  and writer present. Discussions held with , and/or with family members; Complete current electronic health record for patient was reviewed in full including consultant notes, ancillary staff notes, nurses and tech notes, labs and vitals.  I certify that this patients inpatient psychiatric hospital services furnished since the previous certification were, and continue to be, required for

## 2024-01-18 NOTE — PLAN OF CARE
Problem: Discharge Planning  Goal: Discharge to home or other facility with appropriate resources  1/18/2024 0819 by Aleyda Loera RN  Outcome: Progressing  Flowsheets (Taken 1/18/2024 0819)  Discharge to home or other facility with appropriate resources:   Identify barriers to discharge with patient and caregiver   Arrange for needed discharge resources and transportation as appropriate   Identify discharge learning needs (meds, wound care, etc)   Refer to discharge planning if patient needs post-hospital services based on physician order or complex needs related to functional status, cognitive ability or social support system

## 2024-01-18 NOTE — PROGRESS NOTES
Behavioral Services  Medicare Certification Upon Admission    I certify that this patient's inpatient psychiatric hospital admission is medically necessary for:    [x] (1) Treatment which could reasonably be expected to improve this patient's condition,       [] (2) Or for diagnostic study;     AND     [x](2) The inpatient psychiatric services are provided while the individual is under the care of a physician and are included in the individualized plan of care.    Estimated length of stay/service 3-5 days.    Plan for post-hospital care Outpatient Psychiatry.    Electronically signed by Joe Roach MD on 1/15/2024 at 10:29 AM      
Admission Medication Reconciliation:    Information obtained from:  patient interview, Insurance claims data, review of EMR, and [facility]  RxQuery data available¹:  YES    Comments/Recommendations: Updated PTA meds/reviewed patient's allergies.    1)  Patient reports that most recently she was prescribed cariprazine but has been off of it for ~1 month due to issues with refills. She feels like it was helpful, \"raises the floor of my mood and it didn't fluctuate as much.\" She reports significant history of psychiatric medication use, \"pretty much all the antidepressants.\" She reports that specifically venlafaxine and bupropion were \"not good, caused manic episodes.\" In 2023, the patient was prescribed duloxetine and carbamazepine. She reports that lamotrigine helped for a little while but then it stopped working. She has been on naltrexone but she does not think it made any real difference and is not interested in restarting.     She has been on hormone treatment in the past (last 9/2023) but states that [facility]     Patient endorses daily alcohol for at least the last of couple weeks,  upon arrival (1/14 @ 1444), trending down (284-->226-->168 @ 2033). Denies history of withdrawal seizures. Has a previous history of rehab.    2)  The [place] Prescription Monitoring Program () was assessed to determine fill history of any controlled medications. Unable to locate the patient in the database.    3)  Medication changes (since last review): none   ¹RxQuery pharmacy benefit data reflects medications filled and processed through the patient's insurance, however this data does NOT capture whether the medication was picked up or is currently being taken by the patient.    Allergies:  Patient has no known allergies.    Significant PMH/Disease States:   Past Medical History:   Diagnosis Date    Depression        Chief Complaint for this Admission:    Chief 
Pharmacist Discharge Medication Reconciliation    Discharging Provider: Minerva Aldana NP    Significant PMH:   Past Medical History:   Diagnosis Date    Depression      Chief Complaint for this Admission:   Chief Complaint   Patient presents with    Mental Health Problem     SI/ETOH       Allergies: Patient has no known allergies.    Discharge Medications:      Medication List        START taking these medications      cariprazine hcl 1.5 MG capsule  Commonly known as: VRAYLAR  Take 1 capsule by mouth daily  Start taking on: January 19, 2024     multivitamin Tabs tablet  Take 1 tablet by mouth daily  Start taking on: January 19, 2024     traZODone 50 MG tablet  Commonly known as: DESYREL  Take 1 tablet by mouth nightly as needed for Sleep               Where to Get Your Medications        These medications were sent to [facility] - [place], [place] - [address] [place] [address] - P [phone] - F [phone]  [address] [place] [id number]      Phone: [phone]   cariprazine hcl 1.5 MG capsule  multivitamin Tabs tablet  traZODone 50 MG tablet       The patient's chart, MAR and AVS were reviewed by Anaid Herrera RPH.  
(Oral)   Resp 19   Ht 1.778 m (5' 10\")   Wt 67.4 kg (148 lb 9.4 oz)   SpO2 98%   BMI 21.32 kg/m²     Renal Function, Hepatic Function and Chemistry  Estimated Creatinine Clearance (based on SCr of 0.91 mg/dL)  Female: 89 mL/min  Male: 105 mL/min    Lab Results   Component Value Date/Time     01/14/2024 02:44 PM    K 3.5 01/14/2024 02:44 PM     01/14/2024 02:44 PM    CO2 28 01/14/2024 02:44 PM    BUN 9 01/14/2024 02:44 PM    GLOB 3.4 01/14/2024 02:44 PM    ALT 28 01/14/2024 02:44 PM    AST 30 01/14/2024 02:44 PM       Glucose/Hemoglobin A1c  No results found for: \"GLU\", \"GLUCPOC\"  No results found for: \"LABA1C\", \"URB8VQIN\", \"EAG\"    Hematology  Lab Results   Component Value Date/Time    WBC 5.7 01/14/2024 02:44 PM    RBC 4.65 01/14/2024 02:44 PM    HGB 15.9 01/14/2024 02:44 PM    HCT 45.6 01/14/2024 02:44 PM    MCV 98.1 01/14/2024 02:44 PM    MCH 34.2 01/14/2024 02:44 PM    MCHC 34.9 01/14/2024 02:44 PM    RDW 13.4 01/14/2024 02:44 PM     01/14/2024 02:44 PM       Lipids  No results found for: \"CHOL\", \"TRIG\", \"HDL\", \"LDLCALC\", \"LABVLDL\", \"CHOLHDLRATIO\"    Thyroid Function  No results found for: \"TSH\", \"TSH2\", \"TSH3\", \"TSHELE\", \"T3RIA\", \"T3UP\", \"FT3\", \"FT4\", \"FT4P\", \"T4\", \"T4P\", \"FT4T\", \"TT7\"    Pregnancy Status  No results found for: \"HCGUQC\", \"PREGU\", \"HCGQR\", \"THCGA1\", \"PREGTESTUR\"    Assessment/Plan:  Will order lipid panel and hemoglobin A1c or fasting glucose to complete the recommended baseline laboratory monitoring based on the patient's current medication regimen.        Anaid Herrera RP  
Resp 16   Ht 1.778 m (5' 10\")   Wt 67.4 kg (148 lb 9.4 oz)   SpO2 98%   BMI 21.32 kg/m²     Renal Function, Hepatic Function and Chemistry  Estimated Creatinine Clearance (based on SCr of 0.89 mg/dL)  Female: 91 mL/min  Male: 107 mL/min    Lab Results   Component Value Date/Time     01/16/2024 05:27 AM    K 3.9 01/16/2024 05:27 AM     01/16/2024 05:27 AM    CO2 23 01/16/2024 05:27 AM    BUN 10 01/16/2024 05:27 AM    GLOB 2.9 01/16/2024 05:27 AM    ALT 26 01/16/2024 05:27 AM    AST 20 01/16/2024 05:27 AM     Glucose/Hemoglobin A1c  No results found for: \"GLU\", \"GLUCPOC\"  Lab Results   Component Value Date/Time    LABA1C 5.0 01/16/2024 05:27 AM    EAG 97 01/16/2024 05:27 AM     Hematology  Lab Results   Component Value Date/Time    WBC 4.5 01/16/2024 05:27 AM    RBC 4.53 01/16/2024 05:27 AM    HGB 14.9 01/16/2024 05:27 AM    HCT 43.3 01/16/2024 05:27 AM    MCV 95.6 01/16/2024 05:27 AM    MCH 32.9 01/16/2024 05:27 AM    MCHC 34.4 01/16/2024 05:27 AM    RDW 13.2 01/16/2024 05:27 AM     01/16/2024 05:27 AM     Lipids  Lab Results   Component Value Date/Time    CHOL 160 01/16/2024 05:27 AM    TRIG 175 01/16/2024 05:27 AM    HDL 66 01/16/2024 05:27 AM    LDLCALC 59 01/16/2024 05:27 AM    LABVLDL 35 01/16/2024 05:27 AM    CHOLHDLRATIO 2.4 01/16/2024 05:27 AM     Thyroid Function  No results found for: \"TSH\", \"TSH2\", \"TSH3\", \"TSHELE\", \"T3RIA\", \"T3UP\", \"FT3\", \"FT4\", \"FT4P\", \"T4\", \"T4P\", \"FT4T\", \"TT7\"    Assessment/Plan:  Recommended baseline laboratory monitoring has been completed based on this patient's current medication regimen. No further interventions are needed at this time. Follow-up metabolic monitoring labs should be completed in 3 months (quarterly for the first year of antipsychotic therapy).     Anaid Herrera [facility]

## 2024-01-18 NOTE — TRANSITION OF CARE
Behavioral Health Transition Record to Provider    Patient Name: Meir Barker  YOB: 1985  Medical Record Number: [id number]  Date of Admission: 1/14/2024  Date of Discharge: 1/18/24    Attending Provider: Jeo Roach MD  Discharging Provider: Minerva Aldana NP  To contact this individual call [phone] and ask the  to page.  If unavailable, ask to be transferred to Behavioral Health Provider on call.  A Behavioral Health Provider will be available on call 24/7 and during holidays.    Primary Care Provider: No primary care provider on file.    No Known Allergies    Reason for Admission: Meir Barker is a 38 y.o. White (non-) adult who is currently admitted to the acute/general side of 7th floor behavioral health Unit at [facility].      Patient identifies as a female, goes by 'Lucille.'     Patient's co-workers wanted her to go to [facility] because they were concerned about her behavior and suspected that she was under the influence of alcohol. Patient says that she preferred to go to the inpatient psychiatric setting to received comprehensive services for depressive symptoms and possibly pursue sobriety after detox.     Upon evaluation patient appears subdued and describes struggling with depressive symptoms and passive death wish. At this time no suicidal intent or plan.     Denies experiencing hallucinations of any type.  No fire-arms.    Admission Diagnosis: Suicidal ideation [R45.851]  ETOH abuse [F10.10]  Major depressive disorder, severe (HCC) [F32.2]    * No surgery found *    Results for orders placed or performed during the hospital encounter of 01/14/24   COVID-19 & Influenza Combo    Specimen: Nasopharyngeal   Result Value Ref Range    SARS-CoV-2, PCR Not detected NOTD      Rapid Influenza A By PCR Not detected NOTD      Rapid Influenza B By PCR Not detected NOTD     Urine Culture Hold Sample    Specimen: Urine   Result Value Ref Range    Specimen

## 2024-11-09 ENCOUNTER — HOSPITAL ENCOUNTER (INPATIENT)
Facility: HOSPITAL | Age: 39
LOS: 4 days | Discharge: HOME | DRG: 751 | End: 2024-11-13
Attending: STUDENT IN AN ORGANIZED HEALTH CARE EDUCATION/TRAINING PROGRAM | Admitting: PSYCHIATRY & NEUROLOGY
Payer: MEDICAID

## 2024-11-09 DIAGNOSIS — R45.851 SUICIDAL IDEATIONS: Primary | ICD-10-CM

## 2024-11-09 PROBLEM — F39 UNSPECIFIED MOOD (AFFECTIVE) DISORDER (HCC): Status: ACTIVE | Noted: 2024-11-09

## 2024-11-09 LAB
ALBUMIN SERPL-MCNC: 3.3 G/DL (ref 3.5–5)
ALBUMIN/GLOB SERPL: 1.1 (ref 1.1–2.2)
ALP SERPL-CCNC: 60 U/L (ref 45–117)
ALT SERPL-CCNC: 36 U/L (ref 12–78)
AMPHET UR QL SCN: NEGATIVE
ANION GAP SERPL CALC-SCNC: 9 MMOL/L (ref 2–12)
AST SERPL-CCNC: 34 U/L (ref 15–37)
BARBITURATES UR QL SCN: NEGATIVE
BASOPHILS # BLD: 0 K/UL (ref 0–0.1)
BASOPHILS NFR BLD: 1 % (ref 0–1)
BENZODIAZ UR QL: NEGATIVE
BILIRUB SERPL-MCNC: 0.5 MG/DL (ref 0.2–1)
BUN SERPL-MCNC: 14 MG/DL (ref 6–20)
BUN/CREAT SERPL: 15 (ref 12–20)
CALCIUM SERPL-MCNC: 8.3 MG/DL (ref 8.5–10.1)
CANNABINOIDS UR QL SCN: NEGATIVE
CHLORIDE SERPL-SCNC: 105 MMOL/L (ref 97–108)
CO2 SERPL-SCNC: 32 MMOL/L (ref 21–32)
COCAINE UR QL SCN: NEGATIVE
CREAT SERPL-MCNC: 0.95 MG/DL (ref 0.7–1.3)
DIFFERENTIAL METHOD BLD: ABNORMAL
EOSINOPHIL # BLD: 0.1 K/UL (ref 0–0.4)
EOSINOPHIL NFR BLD: 3 % (ref 0–7)
ERYTHROCYTE [DISTWIDTH] IN BLOOD BY AUTOMATED COUNT: 12.8 % (ref 11.5–14.5)
ETHANOL SERPL-MCNC: 141 MG/DL (ref 0–0.08)
ETHANOL SERPL-MCNC: 259 MG/DL (ref 0–0.08)
ETHANOL SERPL-MCNC: 348 MG/DL (ref 0–0.08)
GLOBULIN SER CALC-MCNC: 2.9 G/DL (ref 2–4)
GLUCOSE SERPL-MCNC: 146 MG/DL (ref 65–100)
HCT VFR BLD AUTO: 41.5 % (ref 36.6–50.3)
HGB BLD-MCNC: 14.1 G/DL (ref 12.1–17)
IMM GRANULOCYTES # BLD AUTO: 0 K/UL (ref 0–0.04)
IMM GRANULOCYTES NFR BLD AUTO: 0 % (ref 0–0.5)
LYMPHOCYTES # BLD: 1.5 K/UL (ref 0.8–3.5)
LYMPHOCYTES NFR BLD: 38 % (ref 12–49)
Lab: NORMAL
MCH RBC QN AUTO: 31.8 PG (ref 26–34)
MCHC RBC AUTO-ENTMCNC: 34 G/DL (ref 30–36.5)
MCV RBC AUTO: 93.7 FL (ref 80–99)
METHADONE UR QL: NEGATIVE
MONOCYTES # BLD: 0.3 K/UL (ref 0–1)
MONOCYTES NFR BLD: 9 % (ref 5–13)
NEUTS SEG # BLD: 1.9 K/UL (ref 1.8–8)
NEUTS SEG NFR BLD: 49 % (ref 32–75)
NRBC # BLD: 0 K/UL (ref 0–0.01)
NRBC BLD-RTO: 0 PER 100 WBC
OPIATES UR QL: NEGATIVE
PCP UR QL: NEGATIVE
PLATELET # BLD AUTO: 154 K/UL (ref 150–400)
PMV BLD AUTO: 10.4 FL (ref 8.9–12.9)
POTASSIUM SERPL-SCNC: 4 MMOL/L (ref 3.5–5.1)
PROT SERPL-MCNC: 6.2 G/DL (ref 6.4–8.2)
RBC # BLD AUTO: 4.43 M/UL (ref 4.1–5.7)
SODIUM SERPL-SCNC: 146 MMOL/L (ref 136–145)
WBC # BLD AUTO: 3.9 K/UL (ref 4.1–11.1)

## 2024-11-09 PROCEDURE — 82077 ASSAY SPEC XCP UR&BREATH IA: CPT

## 2024-11-09 PROCEDURE — 1240000000 HC EMOTIONAL WELLNESS R&B

## 2024-11-09 PROCEDURE — 80307 DRUG TEST PRSMV CHEM ANLYZR: CPT

## 2024-11-09 PROCEDURE — 80053 COMPREHEN METABOLIC PANEL: CPT

## 2024-11-09 PROCEDURE — 36415 COLL VENOUS BLD VENIPUNCTURE: CPT

## 2024-11-09 PROCEDURE — 6370000000 HC RX 637 (ALT 250 FOR IP): Performed by: NURSE PRACTITIONER

## 2024-11-09 PROCEDURE — 85025 COMPLETE CBC W/AUTO DIFF WBC: CPT

## 2024-11-09 PROCEDURE — 99285 EMERGENCY DEPT VISIT HI MDM: CPT

## 2024-11-09 RX ORDER — PHENOBARBITAL 32.4 MG/1
32.4 TABLET ORAL 2 TIMES DAILY
Status: COMPLETED | OUTPATIENT
Start: 2024-11-11 | End: 2024-11-12

## 2024-11-09 RX ORDER — PHENOBARBITAL 32.4 MG/1
32.4 TABLET ORAL EVERY 6 HOURS PRN
Status: ACTIVE | OUTPATIENT
Start: 2024-11-10 | End: 2024-11-12

## 2024-11-09 RX ORDER — HYDROXYZINE HYDROCHLORIDE 25 MG/1
50 TABLET, FILM COATED ORAL 3 TIMES DAILY PRN
Status: DISCONTINUED | OUTPATIENT
Start: 2024-11-09 | End: 2024-11-13 | Stop reason: HOSPADM

## 2024-11-09 RX ORDER — DIPHENHYDRAMINE HYDROCHLORIDE 50 MG/ML
50 INJECTION INTRAMUSCULAR; INTRAVENOUS EVERY 4 HOURS PRN
Status: DISCONTINUED | OUTPATIENT
Start: 2024-11-09 | End: 2024-11-13 | Stop reason: HOSPADM

## 2024-11-09 RX ORDER — NICOTINE 21 MG/24HR
1 PATCH, TRANSDERMAL 24 HOURS TRANSDERMAL DAILY
Status: DISCONTINUED | OUTPATIENT
Start: 2024-11-09 | End: 2024-11-13 | Stop reason: HOSPADM

## 2024-11-09 RX ORDER — ESTRADIOL 2 MG/1
2 TABLET ORAL 3 TIMES DAILY
COMMUNITY

## 2024-11-09 RX ORDER — PHENOBARBITAL 32.4 MG/1
32.4 TABLET ORAL 4 TIMES DAILY
Status: COMPLETED | OUTPATIENT
Start: 2024-11-10 | End: 2024-11-11

## 2024-11-09 RX ORDER — POLYETHYLENE GLYCOL 3350 17 G/17G
17 POWDER, FOR SOLUTION ORAL DAILY PRN
Status: DISCONTINUED | OUTPATIENT
Start: 2024-11-09 | End: 2024-11-13 | Stop reason: HOSPADM

## 2024-11-09 RX ORDER — PHENOBARBITAL 32.4 MG/1
16.2 TABLET ORAL 2 TIMES DAILY
Status: DISCONTINUED | OUTPATIENT
Start: 2024-11-12 | End: 2024-11-12

## 2024-11-09 RX ORDER — MULTIVITAMIN WITH IRON
1 TABLET ORAL DAILY
Status: DISCONTINUED | OUTPATIENT
Start: 2024-11-09 | End: 2024-11-13 | Stop reason: HOSPADM

## 2024-11-09 RX ORDER — SPIRONOLACTONE 50 MG/1
50 TABLET, FILM COATED ORAL 3 TIMES DAILY
COMMUNITY

## 2024-11-09 RX ORDER — HALOPERIDOL 5 MG/ML
5 INJECTION INTRAMUSCULAR EVERY 4 HOURS PRN
Status: DISCONTINUED | OUTPATIENT
Start: 2024-11-09 | End: 2024-11-13 | Stop reason: HOSPADM

## 2024-11-09 RX ORDER — PROGESTERONE 100 MG/1
100 CAPSULE ORAL NIGHTLY
Status: ON HOLD | COMMUNITY
End: 2024-11-11 | Stop reason: ALTCHOICE

## 2024-11-09 RX ORDER — POLYETHYLENE GLYCOL 3350 17 G
2 POWDER IN PACKET (EA) ORAL
Status: DISCONTINUED | OUTPATIENT
Start: 2024-11-09 | End: 2024-11-13 | Stop reason: HOSPADM

## 2024-11-09 RX ORDER — PHENOBARBITAL 32.4 MG/1
64.8 TABLET ORAL EVERY 6 HOURS PRN
Status: ACTIVE | OUTPATIENT
Start: 2024-11-09 | End: 2024-11-10

## 2024-11-09 RX ORDER — TRAZODONE HYDROCHLORIDE 50 MG/1
50 TABLET, FILM COATED ORAL NIGHTLY PRN
Status: DISCONTINUED | OUTPATIENT
Start: 2024-11-09 | End: 2024-11-13 | Stop reason: HOSPADM

## 2024-11-09 RX ORDER — HALOPERIDOL 5 MG/1
5 TABLET ORAL EVERY 4 HOURS PRN
Status: DISCONTINUED | OUTPATIENT
Start: 2024-11-09 | End: 2024-11-13 | Stop reason: HOSPADM

## 2024-11-09 RX ORDER — FOLIC ACID 1 MG/1
1 TABLET ORAL DAILY
Status: DISCONTINUED | OUTPATIENT
Start: 2024-11-09 | End: 2024-11-13 | Stop reason: HOSPADM

## 2024-11-09 RX ORDER — PHENOBARBITAL 32.4 MG/1
64.8 TABLET ORAL 4 TIMES DAILY
Status: DISPENSED | OUTPATIENT
Start: 2024-11-09 | End: 2024-11-10

## 2024-11-09 RX ORDER — MAGNESIUM HYDROXIDE/ALUMINUM HYDROXICE/SIMETHICONE 120; 1200; 1200 MG/30ML; MG/30ML; MG/30ML
30 SUSPENSION ORAL EVERY 6 HOURS PRN
Status: DISCONTINUED | OUTPATIENT
Start: 2024-11-09 | End: 2024-11-13 | Stop reason: HOSPADM

## 2024-11-09 RX ORDER — ACETAMINOPHEN 325 MG/1
650 TABLET ORAL EVERY 4 HOURS PRN
Status: DISCONTINUED | OUTPATIENT
Start: 2024-11-09 | End: 2024-11-13 | Stop reason: HOSPADM

## 2024-11-09 RX ORDER — PHENOBARBITAL 32.4 MG/1
16.2 TABLET ORAL EVERY 6 HOURS PRN
Status: DISCONTINUED | OUTPATIENT
Start: 2024-11-12 | End: 2024-11-13 | Stop reason: HOSPADM

## 2024-11-09 RX ORDER — LANOLIN ALCOHOL/MO/W.PET/CERES
100 CREAM (GRAM) TOPICAL DAILY
Status: DISCONTINUED | OUTPATIENT
Start: 2024-11-09 | End: 2024-11-13 | Stop reason: HOSPADM

## 2024-11-09 RX ADMIN — THERA TABS 1 TABLET: TAB at 14:23

## 2024-11-09 RX ADMIN — PHENOBARBITAL 64.8 MG: 32.4 TABLET ORAL at 14:23

## 2024-11-09 RX ADMIN — Medication 100 MG: at 14:23

## 2024-11-09 RX ADMIN — TRAZODONE HYDROCHLORIDE 50 MG: 50 TABLET ORAL at 20:37

## 2024-11-09 RX ADMIN — PHENOBARBITAL 64.8 MG: 32.4 TABLET ORAL at 20:37

## 2024-11-09 RX ADMIN — FOLIC ACID 1 MG: 1 TABLET ORAL at 14:23

## 2024-11-09 ASSESSMENT — LIFESTYLE VARIABLES
HOW OFTEN DO YOU HAVE A DRINK CONTAINING ALCOHOL: MONTHLY OR LESS
HOW MANY STANDARD DRINKS CONTAINING ALCOHOL DO YOU HAVE ON A TYPICAL DAY: PATIENT DECLINED

## 2024-11-09 ASSESSMENT — PAIN SCALES - GENERAL
PAINLEVEL_OUTOF10: 4
PAINLEVEL_OUTOF10: 0

## 2024-11-09 ASSESSMENT — PAIN DESCRIPTION - LOCATION: LOCATION: HEAD;FACE

## 2024-11-09 ASSESSMENT — PAIN - FUNCTIONAL ASSESSMENT: PAIN_FUNCTIONAL_ASSESSMENT: 0-10

## 2024-11-09 ASSESSMENT — PAIN DESCRIPTION - DESCRIPTORS: DESCRIPTORS: DISCOMFORT

## 2024-11-09 ASSESSMENT — SLEEP AND FATIGUE QUESTIONNAIRES
AVERAGE NUMBER OF SLEEP HOURS: 4
SLEEP PATTERN: DISTURBED/INTERRUPTED SLEEP
DO YOU USE A SLEEP AID: NO
DO YOU HAVE DIFFICULTY SLEEPING: YES

## 2024-11-09 ASSESSMENT — PAIN DESCRIPTION - ORIENTATION: ORIENTATION: LEFT

## 2024-11-09 NOTE — PLAN OF CARE
Problem: Coping  Goal: Pt/Family able to verbalize concerns and demonstrate effective coping strategies  Description: INTERVENTIONS:  1. Assist patient/family to identify coping skills, available support systems and cultural and spiritual values  2. Provide emotional support, including active listening and acknowledgement of concerns of patient and caregivers  3. Reduce environmental stimuli, as able  4. Instruct patient/family in relaxation techniques, as appropriate  5. Assess for spiritual pain/suffering and initiate Spiritual Care, Psychosocial Clinical Specialist consults as needed  Outcome: Not Progressing     Problem: Depression/Self Harm  Goal: Effect of psychiatric condition will be minimized and patient will be protected from self harm  Description: INTERVENTIONS:  1. Assess impact of patient's symptoms on level of functioning, self care needs and offer support as indicated  2. Assess patient/family knowledge of depression, impact on illness and need for teaching  3. Provide emotional support, presence and reassurance  4. Assess for possible suicidal thoughts or ideation. If patient expresses suicidal thoughts or statements do not leave alone, initiate Suicide Precautions, move to a room close to the nursing station and obtain sitter  5. Initiate consults as appropriate with Mental Health Professional, Spiritual Care, Psychosocial CNS, and consider a recommendation to the LIP for a Psychiatric Consultation  Outcome: Not Progressing     Problem: Drug Abuse/Detox  Goal: Will have no detox symptoms and will verbalize plan for changing drug-related behavior  Description: INTERVENTIONS:  1. Administer medication as ordered  2. Monitor physical status  3. Provide emotional support with 1:1 interaction with staff  4. Encourage  recovery focused treatment   Outcome: Not Progressing

## 2024-11-09 NOTE — ED NOTES
TRANSFER - OUT REPORT:    TELEPHONEVerbal report given to ONESIMO MANN on Meir Barker  being transferred to PSYCH UNIT ROOM 321 for routine progression of patient care       Report consisted of patient's Situation, Background, Assessment and   Recommendations(SBAR).     Information from the following report(s) Nurse Handoff Report, ED Encounter Summary, ED SBAR, Intake/Output, MAR, and Recent Results was reviewed with the receiving nurse.    [place] Fall Assessment:    Presents to emergency department  because of falls (Syncope, seizure, or loss of consciousness): No  Age > 70: No  Altered Mental Status, Intoxication with alcohol or substance confusion (Disorientation, impaired judgment, poor safety awaremess, or inability to follow instructions): No  Impaired Mobility: Ambulates or transfers with assistive devices or assistance; Unable to ambulate or transer.: No  Nursing Judgement: No          Lines:       Opportunity for questions and clarification was provided.      Patient transported with:  SECURITY
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
## 2024-11-09 NOTE — ED NOTES
Pt escorted to BR to change into paper scrubs and gripper socks.  Urine specimen cup given to pt to provide sample.

## 2024-11-09 NOTE — ED NOTES
Pt offered dinner tray and accepted.  Pt given disposable dinner tray with plasticware.  Room cleared of all ligatures.  Pt given paper scrubs and yellow gripper socks and instructed to call out when they are done eating and pt will be assisted to BR to change out of street clothes.  Pt verbalized understanding.

## 2024-11-09 NOTE — ED PROVIDER NOTES
data to display    Medical Decision Making  Patient is a 39-year-old who presents to the emergency department with suicidal ideations.  No specific plan.  No homicidal ideations or auditory/visual hallucinations.  No physical complaints.  Patient was ambulatory to the exam room without any difficulty.  Will consult BSMART for further behavioral health evaluation.    Amount and/or Complexity of Data Reviewed  Labs: ordered. Decision-making details documented in ED Course.  Discussion of management or test interpretation with external provider(s): See ED course    Risk  Decision regarding hospitalization.        ED Course as of 11/09/24 0358   Sat Nov 09, 2024   0258 Swathi with BSMART recommending behavioral health admission. [WB]   0333 Ethanol elevated as expected.  Slight hypernatremia at 146 and slight leukopenia.  Patient medically clear for behavioral health admission. [WB]      ED Course User Index  [WB] Joe Ayala MD     FINAL IMPRESSION     1. Suicidal ideations       DISPOSITION/PLAN     CLINICAL IMPRESSION    Behavioral health admission     I am the Primary Clinician of Record.   Joe Ayala MD (electronically signed)    (Please note that parts of this dictation were completed with voice recognition software. Quite often unanticipated grammatical, syntax, homophones, and other interpretive errors are inadvertently transcribed by the computer software. Please disregards these errors. Please excuse any errors that have escaped final proofreading.)          Joe Ayala MD  11/09/24 0359
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
## 2024-11-09 NOTE — ED NOTES
BSMART assessment completed and updates provided to this RN and Dr. Ayala.  Pt is moderate SI risk per BSMART.

## 2024-11-09 NOTE — PROGRESS NOTES
[facility] Admission Pharmacy Medication Reconciliation IN PROGRESS, INTERPRET WITH CAUTION    Information obtained from: Patient interview and RxQuery      Comments/recommendations:  Patient reported only taking hormone supplement therapy, recalled names of medications but was not certain of doses. Reported last taking medications 2 to 3 days ago.  Verified doses against RxQuery data from 2023 (most recent).  Patient reported filling prescriptions at [facility] system and could not find a patient with matching date of birth matched with address in [facility] or last name Mj.  [facility] pharmacy where prescriptions had been filled previously verified that they had not filled any medications since late 2023.  Patient reports medications being prescribed from [facility].  Pharmacist will call on Monday to verify current doses. Phone number for [facility] is [phone]    Medication changes (since last review):  Added  Progesterone 100 mg  Estradiol 2 mg  Spironolactone 50 mg   Removed  Vraylar 1.5 mg daily  MVI daily  Trazodone 50 mg nigthly PRN sleep  Adjusted  none       1RxQuery pharmacy benefit data reflects medications filled and processed through the patient's insurance, however                this data does NOT capture whether the medication was picked up or is currently being taken by the patient.         Patient allergies:   Allergies as of 11/09/2024    (No Known Allergies)         Prior to Admission Medications   Prescriptions Last Dose Informant   estradiol (ESTRACE) 2 MG tablet Past Week Self   Sig: Take 1 tablet by mouth in the morning and at bedtime   progesterone (PROMETRIUM) 100 MG CAPS capsule Past Week Self   Sig: Take 1 capsule by mouth at bedtime.   spironolactone (ALDACTONE) 50 MG tablet Past Week Self   Sig: Take 1 tablet by mouth in the morning, at noon, and at bedtime      Facility-Administered Medications: None          Thank you,  Sherif

## 2024-11-09 NOTE — BSMART NOTE
BSMART assessment completed, and suicide risk level noted to be moderate risk. Primary Nurse Phan and Charge Nurse Nallely and Physician Best notified. Concerns not observed.

## 2024-11-09 NOTE — ED NOTES
Shift change report given to MACKENZIE Gupta.  Report included review of SBAR, accordion report, results review, orders, meds, ROS, and POC.  Awaiting bed assignment.  All questions answered.  Transfer of care complete.
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
## 2024-11-09 NOTE — BSMART NOTE
[facility] under 200 and provided CIWAs/vitals are unremarkable

## 2024-11-10 PROCEDURE — 6370000000 HC RX 637 (ALT 250 FOR IP): Performed by: NURSE PRACTITIONER

## 2024-11-10 PROCEDURE — 1240000000 HC EMOTIONAL WELLNESS R&B

## 2024-11-10 PROCEDURE — 6370000000 HC RX 637 (ALT 250 FOR IP): Performed by: STUDENT IN AN ORGANIZED HEALTH CARE EDUCATION/TRAINING PROGRAM

## 2024-11-10 RX ORDER — FLUOXETINE 10 MG/1
10 CAPSULE ORAL DAILY
Status: DISCONTINUED | OUTPATIENT
Start: 2024-11-10 | End: 2024-11-13 | Stop reason: HOSPADM

## 2024-11-10 RX ORDER — SPIRONOLACTONE 25 MG/1
50 TABLET ORAL DAILY
Status: DISCONTINUED | OUTPATIENT
Start: 2024-11-10 | End: 2024-11-11

## 2024-11-10 RX ORDER — PROGESTERONE 100 MG/1
100 CAPSULE ORAL EVERY EVENING
Status: DISCONTINUED | OUTPATIENT
Start: 2024-11-10 | End: 2024-11-11

## 2024-11-10 RX ORDER — ESTRADIOL 1 MG/1
2 TABLET ORAL 2 TIMES DAILY
Status: DISCONTINUED | OUTPATIENT
Start: 2024-11-10 | End: 2024-11-11

## 2024-11-10 RX ADMIN — PHENOBARBITAL 64.8 MG: 32.4 TABLET ORAL at 08:47

## 2024-11-10 RX ADMIN — Medication 100 MG: at 08:47

## 2024-11-10 RX ADMIN — PHENOBARBITAL 32.4 MG: 32.4 TABLET ORAL at 15:57

## 2024-11-10 RX ADMIN — PHENOBARBITAL 32.4 MG: 32.4 TABLET ORAL at 20:26

## 2024-11-10 RX ADMIN — PROGESTERONE 100 MG: 100 CAPSULE ORAL at 18:19

## 2024-11-10 RX ADMIN — SPIRONOLACTONE 50 MG: 25 TABLET, FILM COATED ORAL at 15:57

## 2024-11-10 RX ADMIN — TRAZODONE HYDROCHLORIDE 50 MG: 50 TABLET ORAL at 20:26

## 2024-11-10 RX ADMIN — ESTRADIOL 2 MG: 1 TABLET ORAL at 20:26

## 2024-11-10 RX ADMIN — THERA TABS 1 TABLET: TAB at 08:47

## 2024-11-10 RX ADMIN — PHENOBARBITAL 32.4 MG: 32.4 TABLET ORAL at 13:57

## 2024-11-10 RX ADMIN — FOLIC ACID 1 MG: 1 TABLET ORAL at 08:47

## 2024-11-10 RX ADMIN — FLUOXETINE 10 MG: 10 CAPSULE ORAL at 15:58

## 2024-11-10 ASSESSMENT — PAIN SCALES - GENERAL: PAINLEVEL_OUTOF10: 0

## 2024-11-10 NOTE — H&P
within normal limits  Appearance:  well-appearing  Behavior/Motor:  no abnormalities noted  Attitude toward examiner:  good eye contact  Speech:  spontaneous and normal rate  Mood:  depressed  Affect:  mood congruent  Thought processes:  linear  Thought content:  Suicidal Ideation:  denies suicidal ideation  Cognition:  oriented to person, place, and time  Insight:  fair  Judgment:  fair    DIAGNOSIS:  Major Depressive Disorder, recurrent, severe    PLAN:  Admit to [facility]  Gather further information  Medication management  Dispo planning with social work  Estimated length of stay 5-7 days
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
## 2024-11-10 NOTE — PLAN OF CARE
Problem: Coping  Goal: Pt/Family able to verbalize concerns and demonstrate effective coping strategies  Description: INTERVENTIONS:  1. Assist patient/family to identify coping skills, available support systems and cultural and spiritual values  2. Provide emotional support, including active listening and acknowledgement of concerns of patient and caregivers  3. Reduce environmental stimuli, as able  4. Instruct patient/family in relaxation techniques, as appropriate  5. Assess for spiritual pain/suffering and initiate Spiritual Care, Psychosocial Clinical Specialist consults as needed  11/9/2024 2236 by Kami Martinez RN  Outcome: Not Progressing  11/9/2024 1403 by Kallie Villar, RN  Outcome: Not Progressing     Problem: Depression/Self Harm  Goal: Effect of psychiatric condition will be minimized and patient will be protected from self harm  Description: INTERVENTIONS:  1. Assess impact of patient's symptoms on level of functioning, self care needs and offer support as indicated  2. Assess patient/family knowledge of depression, impact on illness and need for teaching  3. Provide emotional support, presence and reassurance  4. Assess for possible suicidal thoughts or ideation. If patient expresses suicidal thoughts or statements do not leave alone, initiate Suicide Precautions, move to a room close to the nursing station and obtain sitter  5. Initiate consults as appropriate with Mental Health Professional, Spiritual Care, Psychosocial CNS, and consider a recommendation to the LIP for a Psychiatric Consultation  11/9/2024 2236 by Kami Martinez RN  Outcome: Not Progressing  11/9/2024 1403 by Kallie Villar, RN  Outcome: Not Progressing     Problem: Drug Abuse/Detox  Goal: Will have no detox symptoms and will verbalize plan for changing drug-related behavior  Description: INTERVENTIONS:  1. Administer medication as ordered  2. Monitor physical status  3. Provide emotional support with 1:1 interaction

## 2024-11-10 NOTE — PLAN OF CARE
Problem: Anxiety  Goal: Will report anxiety at manageable levels  Description: INTERVENTIONS:  1. Administer medication as ordered  2. Teach and rehearse alternative coping skills  3. Provide emotional support with 1:1 interaction with staff  Outcome: Not Progressing     Problem: Coping  Goal: Pt/Family able to verbalize concerns and demonstrate effective coping strategies  Description: INTERVENTIONS:  1. Assist patient/family to identify coping skills, available support systems and cultural and spiritual values  2. Provide emotional support, including active listening and acknowledgement of concerns of patient and caregivers  3. Reduce environmental stimuli, as able  4. Instruct patient/family in relaxation techniques, as appropriate  5. Assess for spiritual pain/suffering and initiate Spiritual Care, Psychosocial Clinical Specialist consults as needed  Outcome: Not Progressing     Problem: Depression/Self Harm  Goal: Effect of psychiatric condition will be minimized and patient will be protected from self harm  Description: INTERVENTIONS:  1. Assess impact of patient's symptoms on level of functioning, self care needs and offer support as indicated  2. Assess patient/family knowledge of depression, impact on illness and need for teaching  3. Provide emotional support, presence and reassurance  4. Assess for possible suicidal thoughts or ideation. If patient expresses suicidal thoughts or statements do not leave alone, initiate Suicide Precautions, move to a room close to the nursing station and obtain sitter  5. Initiate consults as appropriate with Mental Health Professional, Spiritual Care, Psychosocial CNS, and consider a recommendation to the LIP for a Psychiatric Consultation  Outcome: Not Progressing

## 2024-11-11 PROCEDURE — 6370000000 HC RX 637 (ALT 250 FOR IP): Performed by: NURSE PRACTITIONER

## 2024-11-11 PROCEDURE — 1240000000 HC EMOTIONAL WELLNESS R&B

## 2024-11-11 PROCEDURE — 6370000000 HC RX 637 (ALT 250 FOR IP): Performed by: STUDENT IN AN ORGANIZED HEALTH CARE EDUCATION/TRAINING PROGRAM

## 2024-11-11 RX ORDER — ESTRADIOL 1 MG/1
2 TABLET ORAL 3 TIMES DAILY
Status: DISCONTINUED | OUTPATIENT
Start: 2024-11-11 | End: 2024-11-13 | Stop reason: HOSPADM

## 2024-11-11 RX ORDER — PROGESTERONE 100 MG/1
200 CAPSULE ORAL
Status: DISCONTINUED | OUTPATIENT
Start: 2024-11-11 | End: 2024-11-13 | Stop reason: HOSPADM

## 2024-11-11 RX ORDER — PROGESTERONE 200 MG/1
200 CAPSULE ORAL
COMMUNITY

## 2024-11-11 RX ORDER — SPIRONOLACTONE 25 MG/1
50 TABLET ORAL 3 TIMES DAILY
Status: DISCONTINUED | OUTPATIENT
Start: 2024-11-11 | End: 2024-11-13 | Stop reason: HOSPADM

## 2024-11-11 RX ADMIN — SPIRONOLACTONE 50 MG: 25 TABLET, FILM COATED ORAL at 20:36

## 2024-11-11 RX ADMIN — FLUOXETINE 10 MG: 10 CAPSULE ORAL at 08:56

## 2024-11-11 RX ADMIN — SPIRONOLACTONE 50 MG: 25 TABLET, FILM COATED ORAL at 15:47

## 2024-11-11 RX ADMIN — THERA TABS 1 TABLET: TAB at 08:57

## 2024-11-11 RX ADMIN — SPIRONOLACTONE 50 MG: 25 TABLET, FILM COATED ORAL at 08:56

## 2024-11-11 RX ADMIN — PHENOBARBITAL 32.4 MG: 32.4 TABLET ORAL at 20:36

## 2024-11-11 RX ADMIN — PHENOBARBITAL 32.4 MG: 32.4 TABLET ORAL at 08:57

## 2024-11-11 RX ADMIN — FOLIC ACID 1 MG: 1 TABLET ORAL at 08:57

## 2024-11-11 RX ADMIN — ESTRADIOL 2 MG: 1 TABLET ORAL at 08:56

## 2024-11-11 RX ADMIN — Medication 100 MG: at 08:57

## 2024-11-11 RX ADMIN — HYDROXYZINE HYDROCHLORIDE 50 MG: 25 TABLET, FILM COATED ORAL at 03:25

## 2024-11-11 RX ADMIN — ESTRADIOL 2 MG: 1 TABLET ORAL at 20:35

## 2024-11-11 RX ADMIN — ESTRADIOL 2 MG: 1 TABLET ORAL at 15:47

## 2024-11-11 RX ADMIN — TRAZODONE HYDROCHLORIDE 50 MG: 50 TABLET ORAL at 20:36

## 2024-11-11 RX ADMIN — PROGESTERONE 200 MG: 100 CAPSULE ORAL at 20:35

## 2024-11-11 ASSESSMENT — PAIN SCALES - GENERAL: PAINLEVEL_OUTOF10: 0

## 2024-11-11 NOTE — GROUP NOTE
Group Therapy Note    Date: 11/11/2024    Group Start Time: 1000  Group End Time: 1100  Group Topic: Relaxation    [facility] 3 ACUTE BEH[facility]    Latoya Bello        Group Therapy Note    Attendees: 5       Patient's Goal:  To participate in relaxation activity    Notes:  Pt did not attend session    Discipline Responsible: Recreational Therapist      Signature:  LATOYA BELLO

## 2024-11-11 NOTE — CARE COORDINATION
11/11/24 1105   ITP   Date of Plan 11/11/24   Date of Next Review 11/15/24   Primary Diagnosis Code Unspecified mood (affective) disorder (HCC)   Barriers to Treatment Need for psychoeducation   Strengths Incorporated in Plan Acknowledging need for assistance;Verbal   Plan of Care   Long Term Goal (LTG) Stated in patient/guardian terms \"to have better coping skills\"   Short Term Goal 1   Short Term Goal 1 Client will learn and demonstrate effective coping skills   Baseline Functioning Client reports passive SI, increased depression and anxiety   Target Client will feel confident using healthy coping skills   Objectives Other (comment)   Intervention 1 Group therapy   Frequency Daily to learn, practice, and implement coping skills   Measured by Staff observation;Self report   Staff Responsible Clinical staff;[facility] staff   Intervention 2 Milieu therapy and support   Frequency Daily for peer support and socialization   Measured by Staff observation;Self report   Staff Responsible Clinical staff;[facility] staff   STG Goal 1 Status: Patient Appears to be  Progressing toward treatment plan goal   Short Term Goal 2   Short Term Goal 2 Client will maintain compliance with medication regime   Baseline Functioning Client reports unmanageable anxiety and depression   Target Client will feel supported by and comply with medication regimen   Objectives Other (comment)   Intervention 1 Monitor medications   Frequency Daily to monitr for side effects and efficacy   Measured by Staff observation;Self report   Staff Responsible Clinical staff;[facility] staff   Intervention 2 Referral to community services   Frequency At discharge for continuity of care   Measured by Staff observation   Staff Responsible Clinical staff;[facility] staff   STG Goal 2 Status: Patient Appears to be  Progressing toward treatment plan goal   Crisis/Safety/Discharge Plan   Crisis/Safety Plan Standard program interventions and protocol   Comprehensive Assessment Completion

## 2024-11-11 NOTE — GROUP NOTE
Group Therapy Note    Date: 11/11/2024    Group Start Time: 1400  Group End Time: 1500  Group Topic: Recreational    [facility] 3 ACUTE BEH[facility]    Latoya Bello        Group Therapy Note    Attendees: 5       Patient's Goal:  To concentrate on selected task    Notes:  Pt did not attend session    Discipline Responsible: Recreational Therapist      Signature:  LATOYA BELLO

## 2024-11-11 NOTE — PROGRESS NOTES
Night Shift assessment completed.   Vanora \" Mary \" is isolative to room, alert, calm, cooperative and free from distress. Affect blunt. Mood depressed. Endorses depression of 8/10 and anxiety of 5/10. Denies SI/HI/AH/VH and pain. C-SSRS, No risk. Interacts appropriately and able to make needs known.     Nursing Intervention: VS T 97.8 °F (36.6 °C),HR 67 R 16, B/P 129/79, O2 Sat 100 %. Med and meal compliant. Requested and received PRN Trazodone for sleep with good effect. Emotional support and encouragement provided. No side effects from medications observed.   03:25; Pt requested and received PRN Atarax for anxiety.    Response: Positive.    Plan: Monitoring for safety and behavior continues q 15 mins. Pt appears to have slept for 8 hours.
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
## 2024-11-11 NOTE — PLAN OF CARE
Problem: Discharge Planning  Goal: Discharge to home or other facility with appropriate resources  Outcome: Not Progressing     Problem: Anxiety  Goal: Will report anxiety at manageable levels  Description: INTERVENTIONS:  1. Administer medication as ordered  2. Teach and rehearse alternative coping skills  3. Provide emotional support with 1:1 interaction with staff  11/10/2024 2155 by Shelly Holcomb RN  Outcome: Not Progressing     Problem: Coping  Goal: Pt/Family able to verbalize concerns and demonstrate effective coping strategies  Description: INTERVENTIONS:  1. Assist patient/family to identify coping skills, available support systems and cultural and spiritual values  2. Provide emotional support, including active listening and acknowledgement of concerns of patient and caregivers  3. Reduce environmental stimuli, as able  4. Instruct patient/family in relaxation techniques, as appropriate  5. Assess for spiritual pain/suffering and initiate Spiritual Care, Psychosocial Clinical Specialist consults as needed  11/10/2024 2155 by Shelly Holcomb, RN  Outcome: Not Progressing     Problem: Depression  Goal: Will be euthymic at discharge  Description: INTERVENTIONS:  1. Administer medication as ordered  2. Provide emotional support via 1:1 interaction with staff  3. Encourage involvement in milieu/groups/activities  4. Monitor for social isolation  Outcome: Not Progressing

## 2024-11-11 NOTE — PROGRESS NOTES
Comprehensive Nutrition Assessment    Type and Reason for Visit: Initial    Nutrition Recommendations/Plan:   Diet as tolerated.  Double portions  Supplements ordered for meal trays       Malnutrition Assessment:  Malnutrition Status:  Moderate malnutrition (11/11/24 0641)    Context:  Social/Environmental Circumstances     Findings of the 6 clinical characteristics of malnutrition:  Energy Intake:  50% or less estimated energy requirements for 1 month or longer  Weight Loss:  5% over 1 month     Body Fat Loss:  Severe body fat loss Orbital, Fat Overlying Ribs, Buccal region   Muscle Mass Loss:  Mild muscle mass loss Temples (temporalis), Clavicles (pectoralis & deltoids), Calf (gastrocnemius)  Fluid Accumulation:  No fluid accumulation     Strength:  Not Performed       Nutrition Assessment:    Pt admitted to [facility] for ETOH intoxication with depression.  Pt drank the day of admission, has relapsed followin a long period of sobriety.  Reports not eating for the past 2 weeks with ~ 20#~ wt loss (EMR confirms).  Other hx notable for low weight/BMI, hypertriglyceridemia.    Mod pro/kcal malnutrition (social/chronic) r/t inadequate oral intake WEB pt report of 5% unintentional wt loss past month with dietary recall meeting < 50% est needs > 5 days PTA due to depression, ETOH abuse.  NPE reveals moderate muscle atrophy (temp, clav, calf) and severe fat wasting (orb, tri, ribs).    Nutrition Related Findings:    Pt tolerating diet Wound Type: None       Lab Results   Component Value Date/Time     11/09/2024 02:52 AM    K 4.0 11/09/2024 02:52 AM     11/09/2024 02:52 AM    CO2 32 11/09/2024 02:52 AM    BUN 14 11/09/2024 02:52 AM    CREATININE 0.95 11/09/2024 02:52 AM    GLUCOSE 146 11/09/2024 02:52 AM    CALCIUM 8.3 11/09/2024 02:52 AM          Estimated Daily Nutrient Needs:  Energy Requirements Based On: Kcal/kg  Weight Used for Energy Requirements: Ideal  Energy (kcal/day): 2430  Weight Used for Protein

## 2024-11-11 NOTE — PROGRESS NOTES
[facility] Admission Pharmacy Medication Reconciliation    Information obtained from: [facility] pharmacy, patient interview  RxQuery data available1: Yes (limited)    Comments/recommendations:  Please see med rec note from Sherif Micki from 11/9/24 for more information.     Patient's fill history was able to be located at [facility] pharmacy under previous name of \"Paulo Barkre.\"  Last filled below medications in August for 30 DS.  When asked about lack of recent fills, stated she was off the medications for a period of time and ended up with a surplus that she has been using up.       Medication changes (since last review):  Adjusted  Estradiol frequency  Progesterone strength        [facility] reflects medications filled and processed through the patient's insurance, however                this data does NOT capture whether the medication was picked up or is currently being taken by the patient.         Patient allergies:   Allergies as of 11/09/2024    (No Known Allergies)         Prior to Admission Medications   Prescriptions Last Dose Informant   estradiol (ESTRACE) 2 MG tablet Past Week Self, Outside Pharmacy/PCP   Sig: Take 1 tablet by mouth in the morning, at noon, and at bedtime   progesterone (PROMETRIUM) 200 MG CAPS capsule Past Week Outside Pharmacy/PCP, Self   Sig: Take 1 capsule by mouth nightly   spironolactone (ALDACTONE) 50 MG tablet Past Week Self, Outside Pharmacy/PCP   Sig: Take 1 tablet by mouth in the morning, at noon, and at bedtime      Facility-Administered Medications: None          Thank you,  Kathleen Washington, PharmD, BCPS, BCPP  Clinical Pharmacy Specialist, Behavioral Health  Desk: [phone] ([phone])  Pharmacy: [phone] ([phone])

## 2024-11-11 NOTE — CARE COORDINATION
11/11/24 1104   Suicidal Ideation   Wish to be Dead Lifetime - Yes;Past 1 month - Yes   Non-Specific Active Suicidal Thoughts Lifetime - No;Past 1 month - No   Suicidal Behavior Trigger Wish to be Dead   Intensity of Ideation   Lifetime - Most Severe Ideation 4   Lifetime - Most Recent Ideation 3   Frequency 2-5 times in a week   Duration 1-4 hours/a lot of time   Controllability Can control thoughts with some difficulty   Deterrents Uncertain that deterrents stopped you   Reasons for Ideation Mostly to end or stop the pain   Suicidal Behavior   Actual Attempt Lifetime - Yes;Past 3 months - No   Total # of Attempts 1   Has subject engaged in Non-Suicidal Self-Injurious Behavior? Lifetime - Yes;Past 3 months - No   Interrupted Attempt Past 3 months - No;Lifetime - No   Total # of interrupted 0   Aborted or Self-Interrupted Attempt Lifetime - Yes;Past 3 months - Yes   Total # of aborted or self-interrupted 1   Preparatory Acts or Behavior Lifetime - Yes;Past 3 months - No   Total # of Preparatory Acts 1   Actual Lethality/Medical Damage 0   Potential Lethality 2   Most Recent Attempt Date   (2017)     ______________________________________________  CHELSIE White, Supervisee in Social Work  [phone]  [email or web address]  11/11/24

## 2024-11-12 PROCEDURE — 6370000000 HC RX 637 (ALT 250 FOR IP): Performed by: NURSE PRACTITIONER

## 2024-11-12 PROCEDURE — 1240000000 HC EMOTIONAL WELLNESS R&B

## 2024-11-12 RX ORDER — FLUOXETINE 10 MG/1
10 CAPSULE ORAL DAILY
Qty: 30 CAPSULE | Refills: 3 | Status: SHIPPED | OUTPATIENT
Start: 2024-11-13

## 2024-11-12 RX ADMIN — THERA TABS 1 TABLET: TAB at 08:59

## 2024-11-12 RX ADMIN — TRAZODONE HYDROCHLORIDE 50 MG: 50 TABLET ORAL at 20:25

## 2024-11-12 RX ADMIN — FOLIC ACID 1 MG: 1 TABLET ORAL at 08:59

## 2024-11-12 RX ADMIN — ESTRADIOL 2 MG: 1 TABLET ORAL at 08:59

## 2024-11-12 RX ADMIN — HYDROXYZINE HYDROCHLORIDE 50 MG: 25 TABLET, FILM COATED ORAL at 03:27

## 2024-11-12 RX ADMIN — PHENOBARBITAL 32.4 MG: 32.4 TABLET ORAL at 08:59

## 2024-11-12 RX ADMIN — ESTRADIOL 2 MG: 1 TABLET ORAL at 14:17

## 2024-11-12 RX ADMIN — SPIRONOLACTONE 50 MG: 25 TABLET, FILM COATED ORAL at 14:17

## 2024-11-12 RX ADMIN — ESTRADIOL 2 MG: 1 TABLET ORAL at 20:25

## 2024-11-12 RX ADMIN — Medication 100 MG: at 08:59

## 2024-11-12 RX ADMIN — FLUOXETINE 10 MG: 10 CAPSULE ORAL at 08:59

## 2024-11-12 RX ADMIN — PROGESTERONE 200 MG: 100 CAPSULE ORAL at 20:25

## 2024-11-12 RX ADMIN — SPIRONOLACTONE 50 MG: 25 TABLET, FILM COATED ORAL at 20:25

## 2024-11-12 RX ADMIN — SPIRONOLACTONE 50 MG: 25 TABLET, FILM COATED ORAL at 08:59

## 2024-11-12 ASSESSMENT — PAIN SCALES - GENERAL
PAINLEVEL_OUTOF10: 0
PAINLEVEL_OUTOF10: 0

## 2024-11-12 NOTE — GROUP NOTE
Group Therapy Note    Date: 11/12/2024    Group Start Time: 1000  Group End Time: 1100  Group Topic: Relaxation    [facility]    Latoya Bello        Group Therapy Note    Attendees: 8       Patient's Goal:  To participate in relaxation activity    Notes:  Pt did not attend session    Discipline Responsible: Recreational Therapist      Signature:  LATOYA BELLO

## 2024-11-12 NOTE — PROGRESS NOTES
[id number]    Patient is visible in the day room. Observed watching TV. Patient has an appropriate affect. Patient denies SI/HI/AVH at this time. Patient rates current anxiety 2/10 and depression 4/10. Patient states \"I feel like I'm improving.\" No c/o pain or discomfort. PRN Trazodone requested and given with scheduled medication. No other voiced concerns at this time.     0600  No acute changes overnight. Patient slept for 6 hours this shift.

## 2024-11-12 NOTE — DISCHARGE INSTRUCTIONS
DISCHARGE SUMMARY    NAME:Meir Barker  : 1985  MRN: [id number]    The patient Meir Barker exhibits the ability to control behavior in a less restrictive environment.  Patient's level of functioning is improving.  No assaultive/destructive behavior has been observed for the past 24 hours.  No suicidal/homicidal threat or behavior has been observed for the past 24 hours.  There is no evidence of serious medication side effects.  Patient has not been in physical or protective restraints for at least the past 24 hours.    If weapons involved, how are they secured? None involved    Is patient aware of and in agreement with discharge plan? Yes    Arrangements for medication:  Prescriptions sent to [facility] on [address]    Copy of discharge instructions to provider?:  Yes    Arrangements for transportation home:  Walking    Keep all follow up appointments as scheduled, continue to take prescribed medications per physician instructions.  Mental health crisis number:  911 or your local mental health crisis line number at [phone]      Mental Health Emergency WARM LINE      [phone])      M-F: 9am to 9pm      Sat & Sun: 5pm - 9pm  National suicide prevention lines:                             1-460-CVEVTLA (9-693-835-8898)       7-921-402-TALK ([phone])    Crisis Text Line:  Text HOME to 977622

## 2024-11-12 NOTE — PLAN OF CARE
Problem: Pain  Goal: Verbalizes/displays adequate comfort level or baseline comfort level  Outcome: Progressing     Problem: Anxiety  Goal: Will report anxiety at manageable levels  Description: INTERVENTIONS:  1. Administer medication as ordered  2. Teach and rehearse alternative coping skills  3. Provide emotional support with 1:1 interaction with staff  11/11/2024 1607 by Urvashi Rodriguez RN  Outcome: Progressing  Flowsheets (Taken 11/11/2024 0905)  Will report anxiety at manageable levels: Administer medication as ordered

## 2024-11-12 NOTE — PLAN OF CARE
Problem: Drug Abuse/Detox  Goal: Will have no detox symptoms and will verbalize plan for changing drug-related behavior  Description: INTERVENTIONS:  1. Administer medication as ordered  2. Monitor physical status  3. Provide emotional support with 1:1 interaction with staff  4. Encourage  recovery focused treatment   Outcome: Not Progressing

## 2024-11-13 VITALS
HEART RATE: 67 BPM | WEIGHT: 129 LBS | HEIGHT: 72 IN | TEMPERATURE: 97.7 F | DIASTOLIC BLOOD PRESSURE: 66 MMHG | RESPIRATION RATE: 16 BRPM | OXYGEN SATURATION: 98 % | BODY MASS INDEX: 17.47 KG/M2 | SYSTOLIC BLOOD PRESSURE: 121 MMHG

## 2024-11-13 PROCEDURE — 6370000000 HC RX 637 (ALT 250 FOR IP): Performed by: NURSE PRACTITIONER

## 2024-11-13 RX ADMIN — FOLIC ACID 1 MG: 1 TABLET ORAL at 09:19

## 2024-11-13 RX ADMIN — FLUOXETINE 10 MG: 10 CAPSULE ORAL at 09:19

## 2024-11-13 RX ADMIN — Medication 100 MG: at 09:19

## 2024-11-13 RX ADMIN — HYDROXYZINE HYDROCHLORIDE 50 MG: 25 TABLET, FILM COATED ORAL at 04:05

## 2024-11-13 RX ADMIN — SPIRONOLACTONE 50 MG: 25 TABLET, FILM COATED ORAL at 09:18

## 2024-11-13 RX ADMIN — THERA TABS 1 TABLET: TAB at 09:18

## 2024-11-13 RX ADMIN — ESTRADIOL 2 MG: 1 TABLET ORAL at 09:18

## 2024-11-13 ASSESSMENT — PAIN SCALES - GENERAL: PAINLEVEL_OUTOF10: 0

## 2024-11-13 NOTE — PLAN OF CARE
Problem: Discharge Planning  Goal: Discharge to home or other facility with appropriate resources  Outcome: Adequate for Discharge     Problem: Pain  Goal: Verbalizes/displays adequate comfort level or baseline comfort level  11/13/2024 1005 by Anaid Gomez RN  Outcome: Adequate for Discharge  11/12/2024 2228 by Naomi Boles RN  Outcome: Progressing     Problem: Anxiety  Goal: Will report anxiety at manageable levels  Description: INTERVENTIONS:  1. Administer medication as ordered  2. Teach and rehearse alternative coping skills  3. Provide emotional support with 1:1 interaction with staff  11/13/2024 1005 by Anaid Gomez RN  Outcome: Adequate for Discharge  11/12/2024 2228 by Naomi Boles RN  Outcome: Progressing     Problem: Coping  Goal: Pt/Family able to verbalize concerns and demonstrate effective coping strategies  Description: INTERVENTIONS:  1. Assist patient/family to identify coping skills, available support systems and cultural and spiritual values  2. Provide emotional support, including active listening and acknowledgement of concerns of patient and caregivers  3. Reduce environmental stimuli, as able  4. Instruct patient/family in relaxation techniques, as appropriate  5. Assess for spiritual pain/suffering and initiate Spiritual Care, Psychosocial Clinical Specialist consults as needed  Outcome: Adequate for Discharge     Problem: Behavior  Goal: Pt/Family maintain appropriate behavior and adhere to behavioral management agreement, if implemented  Description: INTERVENTIONS:  1. Assess patient/family's coping skills and  non-compliant behavior (including use of illegal substances)  2. Notify security of behavior or suspected illegal substances which indicate the need for search of the family and/or belongings  3. Encourage verbalization of thoughts and concerns in a socially appropriate manner  4. Utilize positive, consistent limit setting strategies supporting safety of

## 2024-11-13 NOTE — PLAN OF CARE
Problem: Pain  Goal: Verbalizes/displays adequate comfort level or baseline comfort level  Outcome: Progressing     Problem: Anxiety  Goal: Will report anxiety at manageable levels  Description: INTERVENTIONS:  1. Administer medication as ordered  2. Teach and rehearse alternative coping skills  3. Provide emotional support with 1:1 interaction with staff  Outcome: Progressing     Problem: Drug Abuse/Detox  Goal: Will have no detox symptoms and will verbalize plan for changing drug-related behavior  Description: INTERVENTIONS:  1. Administer medication as ordered  2. Monitor physical status  3. Provide emotional support with 1:1 interaction with staff  4. Encourage  recovery focused treatment   11/12/2024 0828 by Kelly Avila RN  Outcome: Not Progressing

## 2024-11-13 NOTE — BH NOTE
Psychiatric Progress Note    Patient: Meir Barker MRN: [id number]  SSN: [id number]    YOB: 1985  Age: 39 y.o.  Sex: adult      Admit Date: 11/9/2024    LOS: 3 days     Subjective:     Meir Barker  reports feeling fair and moods are fair.  Denies SI/HI/AH/VH.  No aggression or violence.  Appropriately interactive and aware. Tolerating medications well.  Eating well and sleeping well.    11/12 - Lucille reports feeling fairly this morning. Denies SI/HI/AH/VH. Would like to find a support group for adults with autism. Taking and tolerating medications, No acute overnight events.    Objective:     Vitals:    11/11/24 0856 11/11/24 0905 11/11/24 2012 11/12/24 0845   BP: 133/66 113/66 120/76 (!) 119/56   Pulse:  64 59 61   Resp:  16 17 17   Temp:  97.9 °F (36.6 °C) 97.9 °F (36.6 °C) 98 °F (36.7 °C)   TempSrc:  Oral Oral Oral   SpO2:  98% 100% 100%   Weight:       Height:            Mental Status Exam:   Sensorium  oriented to time, place and person   Relations cooperative   Eye Contact appropriate   Appearance:  age appropriate   Speech:  normal pitch and normal volume   Thought Process: within normal limits   Thought Content no hallucinations and no delusions   Suicidal ideations none   Mood:  anxious   Affect:  normal   Memory   adequate   Concentration:  adequate   Insight:  fair   Judgment:  fair       MEDICATIONS:  Current Facility-Administered Medications   Medication Dose Route Frequency    estradiol (ESTRACE) tablet 2 mg  2 mg Oral TID    progesterone (PROMETRIUM) capsule 200 mg  200 mg Oral QHS    spironolactone (ALDACTONE) tablet 50 mg  50 mg Oral TID    FLUoxetine (PROZAC) capsule 10 mg  10 mg Oral Daily    acetaminophen (TYLENOL) tablet 650 mg  650 mg Oral Q4H PRN    polyethylene glycol (GLYCOLAX) packet 17 g  17 g Oral Daily PRN    aluminum & magnesium hydroxide-simethicone (MAALOX) 200-200-20 MG/5ML suspension 30 mL  30 mL Oral Q6H PRN    hydrOXYzine HCl (ATARAX) tablet 50 mg  50 
       Psychiatric Progress Note    Patient: Meir Barker MRN: [id number]  SSN: [id number]    YOB: 1985  Age: 39 y.o.  Sex: adult      Admit Date: 11/9/2024    LOS: 2 days     Subjective:     Meir Barker  reports feeling fair and moods are fair.  Denies SI/HI/AH/VH.  No aggression or violence.  Appropriately interactive and aware. Tolerating medications well.  Eating well and sleeping well.    Objective:     Vitals:    11/10/24 2015 11/11/24 0635 11/11/24 0856 11/11/24 0905   BP: 129/79  133/66 113/66   Pulse: 67   64   Resp: 16   16   Temp: 97.8 °F (36.6 °C)   97.9 °F (36.6 °C)   TempSrc: Oral   Oral   SpO2: 100%      Weight:       Height:  1.829 m (6')          Mental Status Exam:   Sensorium  oriented to time, place and person   Relations cooperative   Eye Contact appropriate   Appearance:  age appropriate   Speech:  normal pitch and normal volume   Thought Process: within normal limits   Thought Content no hallucinations and no delusions   Suicidal ideations none   Mood:  anxious   Affect:  normal   Memory   adequate   Concentration:  adequate   Insight:  fair   Judgment:  fair       MEDICATIONS:  Current Facility-Administered Medications   Medication Dose Route Frequency    estradiol (ESTRACE) tablet 2 mg  2 mg Oral TID    progesterone (PROMETRIUM) capsule 200 mg  200 mg Oral QHS    spironolactone (ALDACTONE) tablet 50 mg  50 mg Oral TID    FLUoxetine (PROZAC) capsule 10 mg  10 mg Oral Daily    acetaminophen (TYLENOL) tablet 650 mg  650 mg Oral Q4H PRN    polyethylene glycol (GLYCOLAX) packet 17 g  17 g Oral Daily PRN    aluminum & magnesium hydroxide-simethicone (MAALOX) 200-200-20 MG/5ML suspension 30 mL  30 mL Oral Q6H PRN    hydrOXYzine HCl (ATARAX) tablet 50 mg  50 mg Oral TID PRN    haloperidol (HALDOL) tablet 5 mg  5 mg Oral Q4H PRN    Or    haloperidol lactate (HALDOL) injection 5 mg  5 mg IntraMUSCular Q4H PRN    diphenhydrAMINE (BENADRYL) injection 50 mg  50 mg IntraMUSCular Q4H 
   Tara Barker #[id number] (CSN:[id number]) (:1985 39 y.o.) (Adm: 24)  [id number]  PCP    None  Demographics  CommentAddress   [address] [address]    Home Phone   [phone]    Work Phone       Mobile Phone   [phone]             Social Security Number       Insurance Information   [facility] MEDICAID    Marital Status   Single    Baptism   None               Insurance Payors as of 2024    [facility] MEDICAID    Plan: ANTHEM [facility] [employer]KEEPERS PLUS Group: [id number] Member: [id number]   Effective from: 2024 Subscriber: TARA BARKER Subscriber ID: [id number]   Guarantor: TARA BARKER     Documents Filed to Patient    Power of  Living Will Clinical Unknown Study Attachment Consent Form ABN Waiver After Visit Summary Lab Result Scan Code Status MyChart Status Advance Care Planning    Not on File  Not on File  Not on File  Not on File  Not on File  Not on File  Filed  Not on File  FULL [Updated on 24 1326]  Pending Jump to the Activity      Auth/Cert Information    Open auth/cert linked to hospital account [id number]      Emergency Contacts    None on File  Other Contacts    None on File  Admission Information    Current Information    Attending Provider Admitting Provider Admission Type Admission Status   Markie Clark MD Zavage, Michelle E, MD Emergency Confirmed Admission          Admission Date/Time Discharge Date Hospital Service Auth/Cert Status   24  0115  Behavioral Incomplete          Hospital Area Unit Room/Bed    [facility] 3 GEN BEHAV [facility] 321/              Hospital Account    Name Acct ID Class Status Primary Coverage   Tara Barker [id number] BEHAVIOR IP Open [facility]            Guarantor Account (for Hospital Account #[id number])    Name Relation to Pt Service Area Active? Acct Type   Tara Barker Self [facility] Yes 
  PSYCHOSOSCIAL ASSESSMENT     :Patient identifying info:   Meir Barker is a 39 y.o. male admitted 11/9/2024  1:15 AM    Presenting problem and precipitating factors:   Pt presented to ED voluntarily for suicidal ideation. Per BSMART documentation, patient presents with alcohol abuse, anxiety, depression worse, difficulty sleeping, feeling suicidal, increased irritability, poor concentration, and \"I wanted to die\". Pt condition was precipitated by psychosocial stressors: problems related to the social environment and housing problems.    Pt reports new people have moved into the sober living house that they live in and this led to a relapse on alcohol and increased depression. She reports she has been more and more depressed over the last 10 days and is neglecting her self care. She reports the new people who moved into the sober living home make her anxious. She reports a history of autism.    Mental status assessment: Patient presents ao x 4. The patient does appear their stated age. The patient presents Attentive and Cooperative. The patient's behavior is guarded. The patient's mood is depressed, is anxious, and is withdrawn. The patient presents with anhedonia, depressed mood, feelings of hopelessness, insomnia, hypersomnia, fatigue, difficulty concentrating, irritability, excessive worry, and increased use of drugs or alcohol: ETOH. The patient's thought content demonstrates free of delusions and free of hallucinations and shows no evidence of impairment. The patient's affect presents anxious, depressed, and sad. The patient presents without auditory hallucinations. The patient presents without visual hallucinations. Patient presents with Poor insight and Fair judgment. The patient presents with soft speech    Strengths/Recreation/Coping Skills: Exercising self-direction/Resourceful, Access to housing/residential stability, Interpersonal/supportive relationships (family, friends, peers), Knowledge of 
Behavioral Health Interdisciplinary Rounds     Patient Name: Meir Barker Age: 39 y.o. Room/Bed:  321/01  Primary Diagnosis: Unspecified mood (affective) disorder (HCC)  Admission Status: Voluntary     Readmission within 30 days: No  Power of  in place: No  Patient requires a blocked bed: No          Reason for blocked bed: n/a  _____________________________________________________________________  Sleep hours: 6       Participation in Care/Groups:  No  Medication Compliant?: Yes  PRNS (last 24 hours): Antianxiety and Sleep Aid    Restraints (last 24 hours):  No  Substance Abuse:  Yes    24 hour chart check complete: Yes  _____________________________________________________________________  Patient goal(s) for today: Take medications as prescribed, , engage in unit activities, participate in hygiene/ADLs, and communicate needs to appropriate staff  Treatment team focus/goals: MD adjust medications as appropriate, identify discharge planning needs, coordination of care    Progress note: Patient presents ao x 4 They do appear their stated age. The patient appearance shows no evidence of impairment. The patient presents Attentive, Cooperative, and Withdrawn/Quiet. They do appear to be attending to ADLs evidenced by changing clothes. Their speech is soft. The patient's affect presents Blunted. Their affect does appear congruent with their content of speech. The patient does not appear to be responding to internal stimuli. The patient participated when directly confronted with treatment and discharge planning discussion. The patient presents with minimal participation in the milieu, evidenced by remaining isolative to room.      Discussion with patient: Pt reports feeling ready for discharge sooner rather than later. She reports wanting the patient advocate phone number at discharge as she has been misgendered many times on this unit and she feels it is \"more harm than good\". She reports this  
[facility]  Admission Note     Admission Type: Voluntary       Reason for admission:   39 year old transgender male to female admitted with a primary diagnosis of Adjustment Disorder with depressed mood. Patient presented to the ED with ETOH intoxication with depression and SI. Patient verbalized a plan to \"starve myself to death\". Patient reported that she had not eaten in 11 days. Endorses anxiety. Denies HI and AVH.  Upon arrival to the unit, patient reported having passive SI with no plan or intent, and contracts for safety on the unit. CSSRS Low Risk. Notified NP, patient was placed on Q15 minute rounds  for safety.     Patient reported that she has beeen sober for 8-9 months and relapsed yesterday, but reported to the ED that she relapsed 4-5 weeks ago.  on arrival to ED. Currently denies any withdrawal symptoms. Reports a history off withdrawal seizures. CIWA 1. Placed on detox protocol.                             Medical Problems:   Past Medical History:   Diagnosis Date    Depression        Status EXAM:  Mental Status and Behavioral Exam  Normal: No  Level of Assistance: Independent/Self  Facial Expression: Flat  Affect: Constricted  Level of Consciousness: Alert  Frequency of Checks: 4 times per hour, close  Mood:Normal: No  Mood: Sad, Depressed, Anxious  Motor Activity:Normal: Yes  Eye Contact: Fair  Observed Behavior: Tearful  Sexual Misconduct History: Current - no  Preception: [place] to person, [place] to time, [place] to place, [place] to situation  Attention:Normal: Yes  Thought Processes: Unremarkable  Thought Content:Normal: Yes  Depression Symptoms: Feelings of hopelessess, Feelings of helplessness, Crying  Anxiety Symptoms: Generalized  Abby Symptoms: No problems reported or observed.  Hallucinations: None  Delusions: No  Memory:Normal: No  Memory: Poor recent, Poor remote  Insight and Judgment: No  Insight and Judgment: Poor judgment, Poor insight    Pt admitted with 
DISCHARGE SUMMARY     NAME:Meir Barker  :   1985  MRN:   [id number]     The patient Meir Barker exhibits the ability to control behavior in a less restrictive environment.  Patient's level of functioning is improving.  No assaultive/destructive behavior has been observed for the past 24 hours.  No suicidal/homicidal threat or behavior has been observed for the past 24 hours.  There is no evidence of serious medication side effects.  Patient has not been in physical or protective restraints for at least the past 24 hours.     If weapons involved, how are they secured? None involved     Is patient aware of and in agreement with discharge plan? Yes     Arrangements for medication:  Prescriptions sent to [facility] on [address]     Copy of discharge instructions to provider?:  Yes     Arrangements for transportation home:  Walking     Keep all follow up appointments as scheduled, continue to take prescribed medications per physician instructions.  Mental health crisis number:  911 or your local mental health crisis line number at [phone]        Mental Health Emergency WARM LINE      [phone])      M-F: 9am to 9pm      Sat & Sun: 5pm - 9pm  National suicide prevention lines:                             4-202-EGSYVJG (1-758-756-5241)       [phone] ([phone])    Crisis Text Line:  Text HOME to 318491  
Met with patient lying down resting in bed. Flat affect.  Depressed mood. Anxious. Denies SI, HI and AVH. CSSRS No Risk. Sleeping and eating well. Remains guarded and isolative to self/room.  Denies any withdrawal symptoms at this time. CIWA 1. Compliant with medications. No side effects reported. Remains on Q15 minute rounds for safety.   
Morning assessment complete. Patient was encountered in the dayroom. Patient visible, sitting at a table, eating breakfast, and appears to be free of distress.   Patient calm and cooperative with vital signs and assessment.   Patient reports that they are currently experiencing feelings of being overwhelmed. Patient states, \"I am having an autism moment. Sorry if I can't look at you.\" Writer verbalized understanding.  Concerns about discharge plan for this morning.   Patient has confirmed pain rated 0/10. No PRN pain medications given on this shift.    Eye contact is noted to be poor.   Patient reports that last nights sleep was broken due to anxiety about discharging.    Mood is noted to be anxious/depression. Affect is blunt.    Patient visible in the milieu, not interactive with peers.   VS are stable.  Patient currently denies all SI/HI, AVH.   Patient is both med and meal compliant. There are no untoward side effects from medications to note.   Patient reports that they have set a goal to accomplish discharging.    C-SSRS level is \"No Risk\".    Hourly rounds are being completed to assure patient safety and attend to care needs. Monitoring will continue for changes in patient condition.    /66   Pulse 67   Temp 97.7 °F (36.5 °C) (Oral)   Resp 16   Ht 1.829 m (6')   Wt 58.5 kg (129 lb)   SpO2 98%   BMI 17.50 kg/m²      Patient is discharging home to continue treatment in a lesser restrictive environment.  Patient has been referred to [facility] for continuation of care. After visit summary, including follow up instructions, prescriptions, and safety plan reviewed with patient prior to discharge. Opportunity for questions provided. Patient verbalized understanding. Patient provided a copy of AVS at discharge. Patient belongings and valuables returned to patient at discharge. Writer escorted off unit to walk home.   
Per PMHNP Dru[facility] require [facility]1 on the [facility] provided there is no change in presentation from the time of acceptance (0645) to the time of transfer.  
Shift Assessment Note 3950-2006    Patient Alert and Oriented X4, met by RN in patient room. Patient calm and cooperative with assessment. Denies SI. Denies HI. Denies hallucinations. Patient observed resting in bed with head covered by blankets, and is isolative to bed this shift. Patient displays Blunt affect with Fair eye contact, and Depressed, Sad, Anxious mood. Thought process is Unremarkable. Patient Endorses anxiety 5/10 with Generalized symptoms. Patient Endorses depression 9/10 and demonstrates Change in energy level, Feelings of helplessness, Feelings of hopelessess, Isolative, Loss of interest. CIWA of 2 for nausea and anxiety. Patient received Trazodone for sleep this shift. CSSR score of No Risk this shift. Patient compliant with scheduled medication. Patient slept 11 hours overnight. No acute distress noted. No unsafe behaviors observed. Q15 minute observations maintained.    
Writer met with patient in  the hallway during med pass . Patient was  quiet  and appeared calm/free from distress. Patient was cooperative with assessment. Patient is Aox4. Patient presents with a blunt affect and depressed mood. Patient endorses anxiety=2, depression=3. Patient denies SI, HI, pain and AVH. Patient is compliant with scheduled medications. Patient is isolative to themself. Patient appearance is well-groomed. Q15 minute safety checks ongoing to ensure patient safety.     
the patient have a Medical Advance Directive? No  Does the patient have a Psychiatric Advance Directive? No  If the patient does not have a surrogate or Medical Advance Directive AND Psychiatric Advance Directive, the patient was offered information on these advance directives Patient declined to complete    Patient Instructions: Please continue all medications until otherwise directed by physician.      Tobacco Cessation Discharge Plan:   Is the patient a tobacco user  and needs referral for tobacco cessation? No  Patient referred to the following for tobacco cessation with an appointment? Patient refused  Patient was offered medication to assist with tobacco cessation at discharge? Patient refused    Alcohol/Substance Abuse Discharge Plan:   Does the patient have a history of substance/alcohol abuse and requires a referral for treatment? Yes  Patient referred to the following for substance/alcohol abuse treatment with an appointment? Patient refused  Patient was offered medication to assist with substance/alcohol abuse cessation at discharge? Patient refused      Patient discharged to: Home; Transition record discussed with patient/caregiver and provided this record in hard copy or electronically